# Patient Record
Sex: MALE | Race: WHITE | HISPANIC OR LATINO | Employment: FULL TIME | URBAN - METROPOLITAN AREA
[De-identification: names, ages, dates, MRNs, and addresses within clinical notes are randomized per-mention and may not be internally consistent; named-entity substitution may affect disease eponyms.]

---

## 2021-01-05 ENCOUNTER — PRE-ADMISSION TESTING (OUTPATIENT)
Dept: ADMISSIONS | Facility: MEDICAL CENTER | Age: 61
End: 2021-01-05
Attending: ORTHOPAEDIC SURGERY
Payer: COMMERCIAL

## 2021-01-05 DIAGNOSIS — Z01.810 PRE-OPERATIVE CARDIOVASCULAR EXAMINATION: ICD-10-CM

## 2021-01-05 DIAGNOSIS — Z01.812 PRE-OPERATIVE LABORATORY EXAMINATION: ICD-10-CM

## 2021-01-05 LAB
ANION GAP SERPL CALC-SCNC: 16 MMOL/L (ref 7–16)
BUN SERPL-MCNC: 20 MG/DL (ref 8–22)
CALCIUM SERPL-MCNC: 9.8 MG/DL (ref 8.5–10.5)
CHLORIDE SERPL-SCNC: 96 MMOL/L (ref 96–112)
CO2 SERPL-SCNC: 21 MMOL/L (ref 20–33)
CREAT SERPL-MCNC: 1.22 MG/DL (ref 0.5–1.4)
EKG IMPRESSION: NORMAL
EST. AVERAGE GLUCOSE BLD GHB EST-MCNC: 318 MG/DL
GLUCOSE SERPL-MCNC: 234 MG/DL (ref 65–99)
HBA1C MFR BLD: 12.7 % (ref 0–5.6)
POTASSIUM SERPL-SCNC: 4.5 MMOL/L (ref 3.6–5.5)
SODIUM SERPL-SCNC: 133 MMOL/L (ref 135–145)

## 2021-01-05 PROCEDURE — 80048 BASIC METABOLIC PNL TOTAL CA: CPT

## 2021-01-05 PROCEDURE — 93005 ELECTROCARDIOGRAM TRACING: CPT

## 2021-01-05 PROCEDURE — 93010 ELECTROCARDIOGRAM REPORT: CPT | Performed by: INTERNAL MEDICINE

## 2021-01-05 PROCEDURE — 83036 HEMOGLOBIN GLYCOSYLATED A1C: CPT

## 2021-01-05 PROCEDURE — 36415 COLL VENOUS BLD VENIPUNCTURE: CPT

## 2021-01-05 RX ORDER — MULTIVIT WITH MINERALS/LUTEIN
1 TABLET ORAL EVERY MORNING
COMMUNITY
End: 2021-02-18

## 2021-01-05 RX ORDER — SULFAMETHOXAZOLE AND TRIMETHOPRIM 800; 160 MG/1; MG/1
1 TABLET ORAL 2 TIMES DAILY
Status: ON HOLD | COMMUNITY
End: 2021-02-01

## 2021-01-05 RX ORDER — MULTIVIT-MIN/IRON/FOLIC ACID/K 18-600-40
4000 CAPSULE ORAL EVERY MORNING
COMMUNITY
End: 2021-02-18

## 2021-01-05 RX ORDER — GLIPIZIDE 5 MG/1
5 TABLET ORAL 2 TIMES DAILY PRN
COMMUNITY

## 2021-01-05 NOTE — OR NURSING
Pt tested (+) for covid 12/13, denies symptoms since 12/15 minus continued shortness of breath with exertion. Dr. Smalls's office notified. Per office wants to proceed with surgery.

## 2021-01-05 NOTE — OR NURSING
COVID-19 Pre-surgery screenin. Do you have an undiagnosed respiratory illness or symptoms such as coughing or sneezing? No   a. Onset of Sx No  b. Acute vs. chronic respiratory illness No    2. Do you have an unexplained fever greater than 100.4 degrees Fahrenheit or 38 degrees Celsius?     No    3. Have you had direct exposure to a patient who tested positive for Covid-19?    No    4. Have you had any loss of your sense of taste or smell? Have you had N/V or sore throat? No    Patient has been informed of visitor policy and asked to wear a mask upon entering the hospital   Yes

## 2021-01-06 ENCOUNTER — ANESTHESIA (OUTPATIENT)
Dept: SURGERY | Facility: MEDICAL CENTER | Age: 61
End: 2021-01-06
Payer: COMMERCIAL

## 2021-01-06 ENCOUNTER — HOSPITAL ENCOUNTER (OUTPATIENT)
Facility: MEDICAL CENTER | Age: 61
End: 2021-01-06
Attending: ORTHOPAEDIC SURGERY | Admitting: ORTHOPAEDIC SURGERY
Payer: COMMERCIAL

## 2021-01-06 ENCOUNTER — APPOINTMENT (OUTPATIENT)
Dept: RADIOLOGY | Facility: MEDICAL CENTER | Age: 61
End: 2021-01-06
Attending: ORTHOPAEDIC SURGERY
Payer: COMMERCIAL

## 2021-01-06 ENCOUNTER — ANESTHESIA EVENT (OUTPATIENT)
Dept: SURGERY | Facility: MEDICAL CENTER | Age: 61
End: 2021-01-06
Payer: COMMERCIAL

## 2021-01-06 VITALS
BODY MASS INDEX: 24.54 KG/M2 | RESPIRATION RATE: 14 BRPM | SYSTOLIC BLOOD PRESSURE: 100 MMHG | HEIGHT: 73 IN | HEART RATE: 69 BPM | WEIGHT: 185.19 LBS | DIASTOLIC BLOOD PRESSURE: 58 MMHG | TEMPERATURE: 96.6 F | OXYGEN SATURATION: 93 %

## 2021-01-06 DIAGNOSIS — E11.621 DIABETIC ULCER OF RIGHT MIDFOOT ASSOCIATED WITH TYPE 2 DIABETES MELLITUS, WITH NECROSIS OF BONE (HCC): ICD-10-CM

## 2021-01-06 DIAGNOSIS — L97.414 DIABETIC ULCER OF RIGHT MIDFOOT ASSOCIATED WITH TYPE 2 DIABETES MELLITUS, WITH NECROSIS OF BONE (HCC): ICD-10-CM

## 2021-01-06 LAB
GLUCOSE BLD-MCNC: 102 MG/DL (ref 65–99)
GLUCOSE BLD-MCNC: 122 MG/DL (ref 65–99)
GLUCOSE BLD-MCNC: 129 MG/DL (ref 65–99)
GLUCOSE BLD-MCNC: 255 MG/DL (ref 65–99)
GLUCOSE BLD-MCNC: 75 MG/DL (ref 65–99)
PATHOLOGY CONSULT NOTE: NORMAL

## 2021-01-06 PROCEDURE — 160009 HCHG ANES TIME/MIN: Performed by: ORTHOPAEDIC SURGERY

## 2021-01-06 PROCEDURE — 700101 HCHG RX REV CODE 250

## 2021-01-06 PROCEDURE — 87077 CULTURE AEROBIC IDENTIFY: CPT | Mod: 91

## 2021-01-06 PROCEDURE — 87070 CULTURE OTHR SPECIMN AEROBIC: CPT

## 2021-01-06 PROCEDURE — 160035 HCHG PACU - 1ST 60 MINS PHASE I: Performed by: ORTHOPAEDIC SURGERY

## 2021-01-06 PROCEDURE — C1713 ANCHOR/SCREW BN/BN,TIS/BN: HCPCS | Performed by: ORTHOPAEDIC SURGERY

## 2021-01-06 PROCEDURE — A6454 SELF-ADHER BAND W>=3" <5"/YD: HCPCS | Performed by: ORTHOPAEDIC SURGERY

## 2021-01-06 PROCEDURE — 160025 RECOVERY II MINUTES (STATS): Performed by: ORTHOPAEDIC SURGERY

## 2021-01-06 PROCEDURE — 88305 TISSUE EXAM BY PATHOLOGIST: CPT

## 2021-01-06 PROCEDURE — 82962 GLUCOSE BLOOD TEST: CPT | Mod: 91

## 2021-01-06 PROCEDURE — A9270 NON-COVERED ITEM OR SERVICE: HCPCS | Performed by: ORTHOPAEDIC SURGERY

## 2021-01-06 PROCEDURE — 160041 HCHG SURGERY MINUTES - EA ADDL 1 MIN LEVEL 4: Performed by: ORTHOPAEDIC SURGERY

## 2021-01-06 PROCEDURE — 87015 SPECIMEN INFECT AGNT CONCNTJ: CPT | Mod: 91

## 2021-01-06 PROCEDURE — 700101 HCHG RX REV CODE 250: Performed by: ANESTHESIOLOGY

## 2021-01-06 PROCEDURE — A6223 GAUZE >16<=48 NO W/SAL W/O B: HCPCS | Performed by: ORTHOPAEDIC SURGERY

## 2021-01-06 PROCEDURE — 87205 SMEAR GRAM STAIN: CPT

## 2021-01-06 PROCEDURE — 160047 HCHG PACU  - EA ADDL 30 MINS PHASE II: Performed by: ORTHOPAEDIC SURGERY

## 2021-01-06 PROCEDURE — 160029 HCHG SURGERY MINUTES - 1ST 30 MINS LEVEL 4: Performed by: ORTHOPAEDIC SURGERY

## 2021-01-06 PROCEDURE — 160002 HCHG RECOVERY MINUTES (STAT): Performed by: ORTHOPAEDIC SURGERY

## 2021-01-06 PROCEDURE — 501838 HCHG SUTURE GENERAL: Performed by: ORTHOPAEDIC SURGERY

## 2021-01-06 PROCEDURE — 160036 HCHG PACU - EA ADDL 30 MINS PHASE I: Performed by: ORTHOPAEDIC SURGERY

## 2021-01-06 PROCEDURE — 160046 HCHG PACU - 1ST 60 MINS PHASE II: Performed by: ORTHOPAEDIC SURGERY

## 2021-01-06 PROCEDURE — 64447 NJX AA&/STRD FEMORAL NRV IMG: CPT | Performed by: ORTHOPAEDIC SURGERY

## 2021-01-06 PROCEDURE — 500881 HCHG PACK, EXTREMITY: Performed by: ORTHOPAEDIC SURGERY

## 2021-01-06 PROCEDURE — 700101 HCHG RX REV CODE 250: Performed by: ORTHOPAEDIC SURGERY

## 2021-01-06 PROCEDURE — 501330 HCHG SET, CYSTO IRRIG TUBING: Performed by: ORTHOPAEDIC SURGERY

## 2021-01-06 PROCEDURE — 64445 NJX AA&/STRD SCIATIC NRV IMG: CPT | Performed by: ORTHOPAEDIC SURGERY

## 2021-01-06 PROCEDURE — 160048 HCHG OR STATISTICAL LEVEL 1-5: Performed by: ORTHOPAEDIC SURGERY

## 2021-01-06 PROCEDURE — 700111 HCHG RX REV CODE 636 W/ 250 OVERRIDE (IP): Performed by: ANESTHESIOLOGY

## 2021-01-06 PROCEDURE — 700102 HCHG RX REV CODE 250 W/ 637 OVERRIDE(OP): Performed by: ANESTHESIOLOGY

## 2021-01-06 PROCEDURE — 88311 DECALCIFY TISSUE: CPT | Mod: 91

## 2021-01-06 PROCEDURE — A9270 NON-COVERED ITEM OR SERVICE: HCPCS | Performed by: ANESTHESIOLOGY

## 2021-01-06 PROCEDURE — 700111 HCHG RX REV CODE 636 W/ 250 OVERRIDE (IP): Performed by: ORTHOPAEDIC SURGERY

## 2021-01-06 PROCEDURE — 73620 X-RAY EXAM OF FOOT: CPT | Mod: RT

## 2021-01-06 PROCEDURE — 87186 SC STD MICRODIL/AGAR DIL: CPT | Mod: 91

## 2021-01-06 PROCEDURE — 700102 HCHG RX REV CODE 250 W/ 637 OVERRIDE(OP)

## 2021-01-06 PROCEDURE — 700105 HCHG RX REV CODE 258: Performed by: ORTHOPAEDIC SURGERY

## 2021-01-06 PROCEDURE — 87076 CULTURE ANAEROBE IDENT EACH: CPT

## 2021-01-06 PROCEDURE — 87075 CULTR BACTERIA EXCEPT BLOOD: CPT | Mod: 91

## 2021-01-06 DEVICE — WIRE K- SMOOTH .062 - (3TX6=18): Type: IMPLANTABLE DEVICE | Status: FUNCTIONAL

## 2021-01-06 RX ORDER — VANCOMYCIN HYDROCHLORIDE 1 G/20ML
INJECTION, POWDER, LYOPHILIZED, FOR SOLUTION INTRAVENOUS
Status: COMPLETED | OUTPATIENT
Start: 2021-01-06 | End: 2021-01-06

## 2021-01-06 RX ORDER — ONDANSETRON 2 MG/ML
INJECTION INTRAMUSCULAR; INTRAVENOUS PRN
Status: DISCONTINUED | OUTPATIENT
Start: 2021-01-06 | End: 2021-01-06 | Stop reason: SURG

## 2021-01-06 RX ORDER — HYDROMORPHONE HYDROCHLORIDE 1 MG/ML
0.4 INJECTION, SOLUTION INTRAMUSCULAR; INTRAVENOUS; SUBCUTANEOUS
Status: DISCONTINUED | OUTPATIENT
Start: 2021-01-06 | End: 2021-01-06 | Stop reason: HOSPADM

## 2021-01-06 RX ORDER — HYDROMORPHONE HYDROCHLORIDE 1 MG/ML
0.2 INJECTION, SOLUTION INTRAMUSCULAR; INTRAVENOUS; SUBCUTANEOUS
Status: DISCONTINUED | OUTPATIENT
Start: 2021-01-06 | End: 2021-01-06 | Stop reason: HOSPADM

## 2021-01-06 RX ORDER — MIDAZOLAM HYDROCHLORIDE 1 MG/ML
INJECTION INTRAMUSCULAR; INTRAVENOUS PRN
Status: DISCONTINUED | OUTPATIENT
Start: 2021-01-06 | End: 2021-01-06 | Stop reason: SURG

## 2021-01-06 RX ORDER — CEFAZOLIN SODIUM 1 G/3ML
INJECTION, POWDER, FOR SOLUTION INTRAMUSCULAR; INTRAVENOUS PRN
Status: DISCONTINUED | OUTPATIENT
Start: 2021-01-06 | End: 2021-01-06 | Stop reason: SURG

## 2021-01-06 RX ORDER — ONDANSETRON 2 MG/ML
4 INJECTION INTRAMUSCULAR; INTRAVENOUS
Status: DISCONTINUED | OUTPATIENT
Start: 2021-01-06 | End: 2021-01-06 | Stop reason: HOSPADM

## 2021-01-06 RX ORDER — BUPIVACAINE HYDROCHLORIDE 5 MG/ML
INJECTION, SOLUTION EPIDURAL; INTRACAUDAL
Status: COMPLETED | OUTPATIENT
Start: 2021-01-06 | End: 2021-01-06

## 2021-01-06 RX ORDER — ACETAMINOPHEN 500 MG
1000 TABLET ORAL ONCE
Status: COMPLETED | OUTPATIENT
Start: 2021-01-06 | End: 2021-01-06

## 2021-01-06 RX ORDER — HALOPERIDOL 5 MG/ML
1 INJECTION INTRAMUSCULAR
Status: DISCONTINUED | OUTPATIENT
Start: 2021-01-06 | End: 2021-01-06 | Stop reason: HOSPADM

## 2021-01-06 RX ORDER — DEXTROSE MONOHYDRATE 25 G/50ML
INJECTION, SOLUTION INTRAVENOUS
Status: COMPLETED
Start: 2021-01-06 | End: 2021-01-06

## 2021-01-06 RX ORDER — ROPIVACAINE HYDROCHLORIDE 5 MG/ML
INJECTION, SOLUTION EPIDURAL; INFILTRATION; PERINEURAL
Status: COMPLETED | OUTPATIENT
Start: 2021-01-06 | End: 2021-01-06

## 2021-01-06 RX ORDER — LABETALOL HYDROCHLORIDE 5 MG/ML
5 INJECTION, SOLUTION INTRAVENOUS
Status: DISCONTINUED | OUTPATIENT
Start: 2021-01-06 | End: 2021-01-06 | Stop reason: HOSPADM

## 2021-01-06 RX ORDER — SODIUM CHLORIDE, SODIUM LACTATE, POTASSIUM CHLORIDE, CALCIUM CHLORIDE 600; 310; 30; 20 MG/100ML; MG/100ML; MG/100ML; MG/100ML
INJECTION, SOLUTION INTRAVENOUS CONTINUOUS
Status: DISCONTINUED | OUTPATIENT
Start: 2021-01-06 | End: 2021-01-06 | Stop reason: HOSPADM

## 2021-01-06 RX ORDER — HYDROMORPHONE HYDROCHLORIDE 1 MG/ML
0.1 INJECTION, SOLUTION INTRAMUSCULAR; INTRAVENOUS; SUBCUTANEOUS
Status: DISCONTINUED | OUTPATIENT
Start: 2021-01-06 | End: 2021-01-06 | Stop reason: HOSPADM

## 2021-01-06 RX ORDER — OXYCODONE HCL 5 MG/5 ML
10 SOLUTION, ORAL ORAL
Status: DISCONTINUED | OUTPATIENT
Start: 2021-01-06 | End: 2021-01-06 | Stop reason: HOSPADM

## 2021-01-06 RX ORDER — CELECOXIB 200 MG/1
200 CAPSULE ORAL ONCE
Status: COMPLETED | OUTPATIENT
Start: 2021-01-06 | End: 2021-01-06

## 2021-01-06 RX ORDER — DIPHENHYDRAMINE HYDROCHLORIDE 50 MG/ML
12.5 INJECTION INTRAMUSCULAR; INTRAVENOUS
Status: DISCONTINUED | OUTPATIENT
Start: 2021-01-06 | End: 2021-01-06 | Stop reason: HOSPADM

## 2021-01-06 RX ORDER — DEXTROSE MONOHYDRATE 25 G/50ML
25 INJECTION, SOLUTION INTRAVENOUS ONCE
Status: COMPLETED | OUTPATIENT
Start: 2021-01-06 | End: 2021-01-06

## 2021-01-06 RX ORDER — OXYCODONE HCL 5 MG/5 ML
5 SOLUTION, ORAL ORAL
Status: DISCONTINUED | OUTPATIENT
Start: 2021-01-06 | End: 2021-01-06 | Stop reason: HOSPADM

## 2021-01-06 RX ORDER — MEPERIDINE HYDROCHLORIDE 25 MG/ML
12.5 INJECTION INTRAMUSCULAR; INTRAVENOUS; SUBCUTANEOUS
Status: DISCONTINUED | OUTPATIENT
Start: 2021-01-06 | End: 2021-01-06 | Stop reason: HOSPADM

## 2021-01-06 RX ORDER — GABAPENTIN 300 MG/1
300 CAPSULE ORAL ONCE
Status: COMPLETED | OUTPATIENT
Start: 2021-01-06 | End: 2021-01-06

## 2021-01-06 RX ORDER — LIDOCAINE HYDROCHLORIDE 20 MG/ML
INJECTION, SOLUTION EPIDURAL; INFILTRATION; INTRACAUDAL; PERINEURAL PRN
Status: DISCONTINUED | OUTPATIENT
Start: 2021-01-06 | End: 2021-01-06 | Stop reason: SURG

## 2021-01-06 RX ORDER — KETOROLAC TROMETHAMINE 30 MG/ML
INJECTION, SOLUTION INTRAMUSCULAR; INTRAVENOUS PRN
Status: DISCONTINUED | OUTPATIENT
Start: 2021-01-06 | End: 2021-01-06 | Stop reason: SURG

## 2021-01-06 RX ORDER — DEXAMETHASONE SODIUM PHOSPHATE 4 MG/ML
INJECTION, SOLUTION INTRA-ARTICULAR; INTRALESIONAL; INTRAMUSCULAR; INTRAVENOUS; SOFT TISSUE PRN
Status: DISCONTINUED | OUTPATIENT
Start: 2021-01-06 | End: 2021-01-06 | Stop reason: SURG

## 2021-01-06 RX ADMIN — CELECOXIB 200 MG: 200 CAPSULE ORAL at 11:47

## 2021-01-06 RX ADMIN — ACETAMINOPHEN 1000 MG: 500 TABLET ORAL at 11:47

## 2021-01-06 RX ADMIN — LIDOCAINE HYDROCHLORIDE 100 MG: 20 INJECTION, SOLUTION EPIDURAL; INFILTRATION; INTRACAUDAL at 13:03

## 2021-01-06 RX ADMIN — CEFAZOLIN 2 G: 330 INJECTION, POWDER, FOR SOLUTION INTRAMUSCULAR; INTRAVENOUS at 13:02

## 2021-01-06 RX ADMIN — BUPIVACAINE HYDROCHLORIDE 25 ML: 5 INJECTION, SOLUTION EPIDURAL; INTRACAUDAL; PERINEURAL at 12:42

## 2021-01-06 RX ADMIN — DEXTROSE MONOHYDRATE 25 ML: 25 INJECTION, SOLUTION INTRAVENOUS at 15:20

## 2021-01-06 RX ADMIN — ROPIVACAINE HYDROCHLORIDE 5 ML: 5 INJECTION, SOLUTION EPIDURAL; INFILTRATION; PERINEURAL at 12:42

## 2021-01-06 RX ADMIN — DEXTROSE MONOHYDRATE 25 ML: 25 INJECTION, SOLUTION INTRAVENOUS at 14:37

## 2021-01-06 RX ADMIN — ONDANSETRON 4 MG: 2 INJECTION INTRAMUSCULAR; INTRAVENOUS at 13:03

## 2021-01-06 RX ADMIN — BUPIVACAINE HYDROCHLORIDE 20 ML: 5 INJECTION, SOLUTION EPIDURAL; INTRACAUDAL; PERINEURAL at 13:05

## 2021-01-06 RX ADMIN — MIDAZOLAM HYDROCHLORIDE 2 MG: 1 INJECTION, SOLUTION INTRAMUSCULAR; INTRAVENOUS at 13:00

## 2021-01-06 RX ADMIN — PROPOFOL 150 MG: 10 INJECTION, EMULSION INTRAVENOUS at 13:03

## 2021-01-06 RX ADMIN — POVIDONE IODINE 15 ML: 100 SOLUTION TOPICAL at 11:59

## 2021-01-06 RX ADMIN — GABAPENTIN 300 MG: 300 CAPSULE ORAL at 11:47

## 2021-01-06 RX ADMIN — DEXAMETHASONE SODIUM PHOSPHATE 4 MG: 4 INJECTION, SOLUTION INTRA-ARTICULAR; INTRALESIONAL; INTRAMUSCULAR; INTRAVENOUS; SOFT TISSUE at 13:03

## 2021-01-06 RX ADMIN — KETOROLAC TROMETHAMINE 15 MG: 30 INJECTION, SOLUTION INTRAMUSCULAR at 13:03

## 2021-01-06 RX ADMIN — FENTANYL CITRATE 100 MCG: 50 INJECTION, SOLUTION INTRAMUSCULAR; INTRAVENOUS at 13:00

## 2021-01-06 RX ADMIN — SODIUM CHLORIDE, POTASSIUM CHLORIDE, SODIUM LACTATE AND CALCIUM CHLORIDE: 600; 310; 30; 20 INJECTION, SOLUTION INTRAVENOUS at 11:46

## 2021-01-06 ASSESSMENT — PAIN SCALES - GENERAL: PAIN_LEVEL: 2

## 2021-01-06 NOTE — ANESTHESIA PROCEDURE NOTES
Peripheral Block    Date/Time: 1/6/2021 1:05 PM  Performed by: Nader Crow M.D.  Authorized by: Nadre Crow M.D.     Start Time:  1/6/2021 1:05 PM  End Time:  1/6/2021 1:07 PM  Reason for Block: at surgeon's request and post-op pain management ONLY    patient identified, IV checked, site marked, risks and benefits discussed, surgical consent, monitors and equipment checked, pre-op evaluation and timeout performed    Patient Position:  Supine  Prep: ChloraPrep    Monitoring:  Heart rate, continuous pulse ox and cardiac monitor  Block Region:  Lower Extremity  Lower Extremity - Block Type:  Selective FEMORAL nerve block at the Adductor Canal    Laterality:  Right  Procedures: ultrasound guided  Image captured, interpreted and electronically stored.  Local Infiltration:  Lidocaine  Strength:  1 %  Dose:  3 ml  Block Type:  Single-shot  Needle Length:  100mm  Needle Gauge:  21 G  Needle Localization:  Ultrasound guidance  Injection Assessment:  Negative aspiration for heme, no paresthesia on injection, incremental injection and local visualized surrounding nerve on ultrasound

## 2021-01-06 NOTE — PROGRESS NOTES
Med Rec completed per patient and patient family at bedside  Allergies reviewed    Patient reports took Bactrim DS from 12/31/2020-01/04/2021

## 2021-01-06 NOTE — OP REPORT
DATE OF SERVICE:  01/06/2021     PREOPERATIVE DIAGNOSES:  1.  Diabetes.  2.  Neuropathy.  3.  Right plantar foot ulcer with deep abscess.  4.  Right lesser toe deformities.     POSTOPERATIVE DIAGNOSES:  1.  Diabetes.  2.  Neuropathy.  3.  Right plantar foot ulcer with deep abscess.  4.  Right lesser toe deformities.     PROCEDURES PERFORMED:  1.  Right gastrocsoleus recession.  2.  Right metatarsal head excision 2 through 5.  3.  Right hammertoe, 2 and 3.  4.  Right irrigation and debridement of foot, multiple compartments.  5.  Right bone biopsy, second metatarsal.     SURGEON:  Jerald Smalls MD     ASSISTANT:  Lola Sauceda CFA     ANESTHESIA:  General endotracheal with popliteal block for postoperative pain   management.     ESTIMATED BLOOD LOSS:  None.     COMPLICATIONS:  None.     POSTOPERATIVE PLAN:   1.  Nonweightbearing.  2.  Antibiotics.  3.  Follow up in 1 weeks.     SPECIMENS:  1.  Right second metatarsal head.  2.  Right second metatarsal margin both for culture and sensitivity.     INDICATIONS:  The patient is a pleasant 60-year-old male who has had problems   with his right foot.  The above diagnoses made and options were discussed with   him including operative and nonoperative procedure, he elected to undergo   operative intervention.  The above procedure were discussed and all questions   were answered.  The risks of surgery were explained which included but not   limited to wound, postop infection, nerve injury, vascular injury, and need   for surgery.  He understands he could have persistent risk of his pain,   deformity, infection, and need for further surgery.  He understands and accept   these risks and agreed to proceed.  Site was marked by myself prior to   psychotropic medicines.     DESCRIPTION OF PROCEDURE IN DETAIL:  The patient was brought into the   operating room.  He underwent general endotracheal anesthetic without   complications.  His right lower extremity was prepped and  draped in standard   fashion in supine position with all appropriate padding.   Positive site   verification confirmed his right lower extremity as well as above procedure   and confirmation that he had received preoperative antibiotics.  An Esmarch   was used.  A tourniquet was inflated to 250 mmHg.  Posteromedial incision was   made at the level of the musculotendinous junction of the gastroc.  It was   brought down to the crural fascia exposing the gastroc tendon.  Under direct   visualization from the lateral to medial, the gastroc tendon was released,   preserving the underlying soleus fascia.  The wound was irrigated with copious   irrigation and was closed in layered fashion using 3-0 Vicryl and nylon.     Incision was made over the dorsal aspect of his foot and the second and fourth   web space dissected down.  The extensor tendon of the second toe was   completely excised.  This was brought down.  Sharp excisional debridement was   brought down to the level of bone, debriding out any tissue that appeared to   be potentially nonviable down to the level of the second metatarsal and third   metatarsal.  Microsagittal saw was used to remove the metatarsal of the second   just proximal to the neck.  This was removed.  The exact procedure was   performed to the third, fourth, and fifth toes.     On the plantar aspect of his foot, sharp excisional debridement was around his   ulceration.  The ulceration measured approximately 3 x 4 cm going down to the   level of bone.  There was necrotic tissue, which was removed.  Once all   tissue appeared to be viable, a thorough irrigation was performed.  All tissue   appeared to be viable at that point.     Elliptical incision was made over the dorsal aspect of the second proximal   interphalangeal joint.  Microsagittal saw was used to remove the distal aspect   of the proximal phalanx.  A 0.062 K-wire was then run retrograde out to the   tip of the toe and back into across  the proximal phalanx and then into the   second metatarsal.  Exact procedure was performed on the third toe without   deviations and complications.  On the fourth and fifth toe, no hammertoe   correction was performed and so the pin was placed through the tip of the toe   across the length of the toe through the proximal phalanx and then into the   metatarsal.     Microsagittal saw was used to remove a segment of the second metatarsal and   this was labeled as margin and sent for culture and pathology.  Final   irrigation was performed.  Vancomycin powder was placed into the wound.  The   plantar aspect was loosely closed with 2-0 nylon and the skin dorsally was   closed with 3-0 nylon.  Sterile dressing was applied.  Tourniquet released.    All toes are pink.  He was transferred to recovery in good condition.     Utilization of Lola Sauceda was necessary for patient positioning,   retraction, closure, and dressing placement.  She was present for the entire   procedure.        ______________________________  MD MURALI LLOYD/PAULA    DD:  01/06/2021 14:25  DT:  01/06/2021 15:58    Job#:  [id number]

## 2021-01-06 NOTE — ANESTHESIA PROCEDURE NOTES
Airway    Date/Time: 1/6/2021 1:08 PM  Performed by: Nader Crow M.D.  Authorized by: Nader Crow M.D.     Location:  OR  Urgency:  Elective  Indications for Airway Management:  Anesthesia      Spontaneous Ventilation: absent    Sedation Level:  Deep  Preoxygenated: Yes    Final Airway Type:  Supraglottic airway  Final Supraglottic Airway:  Standard LMA    SGA Size:  4  Number of Attempts at Approach:  1

## 2021-01-06 NOTE — ANESTHESIA PROCEDURE NOTES
Peripheral Block    Date/Time: 1/6/2021 12:42 PM  Performed by: Nader Crow M.D.  Authorized by: Nader Crow M.D.     Start Time:  1/6/2021 12:42 PM  End Time:  1/6/2021 12:45 PM  Reason for Block: at surgeon's request and post-op pain management ONLY    patient identified, IV checked, site marked, risks and benefits discussed, surgical consent, monitors and equipment checked, pre-op evaluation and timeout performed    Patient Position:  Supine  Prep: ChloraPrep    Monitoring:  Heart rate, continuous pulse ox and cardiac monitor  Block Region:  Lower Extremity  Lower Extremity - Block Type:  Sciatic, pos/sub - SCIATIC nerve block, posterior or sub-gluteal approach    Laterality:  Right  Procedures: ultrasound guided  Image captured, interpreted and electronically stored.  Local Infiltration:  Lidocaine  Strength:  1 %  Dose:  3 ml  Block Type:  Single-shot  Needle Length:  100mm  Needle Gauge:  21 G  Needle Localization:  Ultrasound guidance  Injection Assessment:  Negative aspiration for heme, no paresthesia on injection, incremental injection and local visualized surrounding nerve on ultrasound

## 2021-01-06 NOTE — OR NURSING
POC reviewed with pt. Call light within reach, educated to call for assistance, hourly rounding in place, bed in lowest position and locked. Multi Modals given.    Pt FSBS 255, Dr Crow aware, no new orders.

## 2021-01-06 NOTE — ANESTHESIA POSTPROCEDURE EVALUATION
Patient: Chaz Cornejo    Procedure Summary     Date: 01/06/21 Room / Location: [place] [address] / SURGERY [facility]    Anesthesia Start: 1259 Anesthesia Stop: 1429    Procedures:       IRRIGATION AND DEBRIDEMENT, WOUND - FOOT W/GASTROC SOLEUS RECESSION (Right Foot)      EXCISION, METATARSAL BONE, HEAD - 2-5 (Right Foot)      BIOPSY, BONE (Right Foot) Diagnosis: (NON-PRESSURE CHRONIC ULCER RIGHT FOOT, HAMMERTOE, ABSCESS OF BURSA RIGHT FOOT)    Surgeons: Jerald Smalls M.D. Responsible Provider: Nader Crow M.D.    Anesthesia Type: general, peripheral nerve block ASA Status: 2          Final Anesthesia Type: general, peripheral nerve block  Last vitals  BP   Blood Pressure: (!) 166/86    Temp   36.6 °C (97.8 °F)    Pulse   Pulse: 67   Resp   12    SpO2   100 %      Anesthesia Post Evaluation    Patient location during evaluation: PACU  Patient participation: complete - patient participated  Level of consciousness: awake and alert  Pain score: 2    Airway patency: patent  Anesthetic complications: no  Cardiovascular status: hemodynamically stable  Respiratory status: acceptable  Hydration status: euvolemic    PONV: none

## 2021-01-06 NOTE — ANESTHESIA TIME REPORT
Anesthesia Start and Stop Event Times     Date Time Event    1/6/2021 1241 Ready for Procedure     1259 Anesthesia Start     1429 Anesthesia Stop        Responsible Staff  01/06/21    Name Role Begin End    Nader Crow M.D. Anesth 1250 1141        Preop Diagnosis (Free Text):  Pre-op Diagnosis     NON-PRESSURE CHRONIC ULCER RIGHT FOOT, HAMMERTOE, ABSCESS OF BURSA RIGHT FOOT        Preop Diagnosis (Codes):    Post op Diagnosis  Diabetic foot infection (HCC)      Premium Reason  Non-Premium    Comments: block x2 usd

## 2021-01-07 LAB
GRAM STN SPEC: NORMAL
GRAM STN SPEC: NORMAL
SIGNIFICANT IND 70042: NORMAL
SIGNIFICANT IND 70042: NORMAL
SITE SITE: NORMAL
SITE SITE: NORMAL
SOURCE SOURCE: NORMAL
SOURCE SOURCE: NORMAL

## 2021-01-07 NOTE — OR NURSING
Discharge orders received. IV taken out. All belongings returned to pt. Pt changed into clothing with assistance.Discharge instructions given and discussed as well as pain management handout. Pt verbalizes understandings and all questions answered at this time. Pt states they are ready to be D/C home. Prescriptions given to pt and verbalizes understanding of medications. Pt D/C via wheelchair with all belongings with CNA

## 2021-01-07 NOTE — OR NURSING
The pt is awake and oriented. Respirations are regular and easy. Pain is controlled, the pt is comfortable. Dressing dry and intact.    Family updated by phone.    Dr Crow notified of blood sugar 122.

## 2021-01-07 NOTE — OR NURSING
Received pt from Recovery. No complaints of pain or N/V at this time. VSS. Dressing to R foot and in boot slight drainage around toes

## 2021-01-07 NOTE — DISCHARGE INSTRUCTIONS
ACTIVITY: Rest and take it easy for the first 24 hours.  A responsible adult is recommended to remain with you during that time.  It is normal to feel sleepy.  We encourage you to not do anything that requires balance, judgment or coordination.    MILD FLU-LIKE SYMPTOMS ARE NORMAL. YOU MAY EXPERIENCE GENERALIZED MUSCLE ACHES, THROAT IRRITATION, HEADACHE AND/OR SOME NAUSEA.    FOR 24 HOURS DO NOT:  Drive, operate machinery or run household appliances.  Drink beer or alcoholic beverages.   Make important decisions or sign legal documents.    SPECIAL INSTRUCTIONS: Touch down weight bearing  Stay in boot  Elevate/ice  Follow any other instructions in [facility] folder or by Physician     DIET: To avoid nausea, slowly advance diet as tolerated, avoiding spicy or greasy foods for the first day.  Add more substantial food to your diet according to your physician's instructions.  Babies can be fed formula or breast milk as soon as they are hungry.  INCREASE FLUIDS AND FIBER TO AVOID CONSTIPATION.    SURGICAL DRESSING/BATHING: Keep boot on and dressing dry. Do not shower for 48hrs. No baths, hot tubs, swimming pools. Cover with plastic when showering.    FOLLOW-UP APPOINTMENT:  A follow-up appointment should be arranged with your doctor in 1-2 weeks; call to schedule.    You should CALL YOUR PHYSICIAN if you develop:  Fever greater than 101 degrees F.  Pain not relieved by medication, or persistent nausea or vomiting.  Excessive bleeding (blood soaking through dressing) or unexpected drainage from the wound.  Extreme redness or swelling around the incision site, drainage of pus or foul smelling drainage.  Inability to urinate or empty your bladder within 8 hours.  Problems with breathing or chest pain.    You should call 911 if you develop problems with breathing or chest pain.  If you are unable to contact your doctor or surgical center, you should go to the nearest emergency room or urgent care center.  Physician's telephone #:  [phone] Dr. Smalls    If any questions arise, call your doctor.  If your doctor is not available, please feel free to call the Surgical Center at [phone]. The Contact Center is open Monday through Friday 7AM to 5PM and may speak to a nurse at [phone], or toll free at [phone].     A registered nurse may call you a few days after your surgery to see how you are doing after your procedure.    MEDICATIONS: Resume taking daily medication.  Take prescribed pain medication with food.  If no medication is prescribed, you may take non-aspirin pain medication if needed.  PAIN MEDICATION CAN BE VERY CONSTIPATING.  Take a stool softener or laxative such as senokot, pericolace, or milk of magnesia if needed.    Prescriptions with patient, follow any instructions from physician or in [facility] folder .  Last pain medication given at 11:47am .    If your physician has prescribed pain medication that includes Acetaminophen (Tylenol), do not take additional Acetaminophen (Tylenol) while taking the prescribed medication.        Peripheral Nerve Block Discharge Instructions from Same Day Surgery and Inpatient :    What to Expect - Lower Extremity  · The block may cause you to experience numbness and weakness in your calf and foot on the same side as your surgery  · Numbness, tingling and / or weakness are all normal. For some people, this may be an unpleasant sensation  · These issues will be resolved when the local anesthetic wears off   · You may experience numbness and tingling in your thigh on the same side as your surgery if the block medicine was injected at your groin area  · Numbness will make it difficult to walk  · You may have problems with balance and walking so be very careful   · Follow your surgeon's direction regarding weight bearing on your surgical limb  · Be very careful with your numb limb  Precautions  · The numbness may affect your balance  · Be careful when walking or moving around  · Your leg  "may be weak: be very careful putting weight on it  · If your surgeon did not specify a time, you should not bear weight for 24 hours  · Be sure to ask for help when you need it  · It is better to have help than to fall and hurt yourself  Prevent Injury  · Protect the limb like a baby  · Beware of exposing your limb to extreme heat or cold or trauma  · The limb may be injured without you noticing because it is numb  · Keep the limb elevated whenever possible  · Do not sleep on the limb  · Change the position of the limb regularly  · Avoid putting pressure on your surgical limb  Pain Control  · The initial block on the day of surgery will make your extremity feel \"numb\"  · Any consecutive injection including prior to discharge from the hospital will make your extremity feel \"numb\"  · You may feel an aching or burning when the local anesthesia starts to wear off  · Take pain pills as prescribed by your surgeon  · Call your surgeon or anesthesiologist if you do not have adequate pain control    Depression / Suicide Risk    As you are discharged from this [facility] facility, it is important to learn how to keep safe from harming yourself.    Recognize the warning signs:  · Abrupt changes in personality, positive or negative- including increase in energy   · Giving away possessions  · Change in eating patterns- significant weight changes-  positive or negative  · Change in sleeping patterns- unable to sleep or sleeping all the time   · Unwillingness or inability to communicate  · Depression  · Unusual sadness, discouragement and loneliness  · Talk of wanting to die  · Neglect of personal appearance   · Rebelliousness- reckless behavior  · Withdrawal from people/activities they love  · Confusion- inability to concentrate     If you or a loved one observes any of these behaviors or has concerns about self-harm, here's what you can do:  · Talk about it- your feelings and reasons for harming yourself  · Remove any means " that you might use to hurt yourself (examples: pills, rope, extension cords, firearm)  · Get professional help from the community (Mental Health, Substance Abuse, psychological counseling)  · Do not be alone:Call your Safe Contact- someone whom you trust who will be there for you.  · Call your local CRISIS HOTLINE 426-2190 or 522-774-5252  · Call your local Children's Mobile Crisis Response Team [place] (173) 658-8249 or www.[employer]  · Call the toll free National Suicide Prevention Hotlines   · National Suicide Prevention Lifeline 021-485-CDJK (7350)  · National Hope Line Network 800-SUICIDE (698-3037)

## 2021-01-11 LAB
BACTERIA TISS AEROBE CULT: ABNORMAL
GRAM STN SPEC: ABNORMAL
GRAM STN SPEC: ABNORMAL
SIGNIFICANT IND 70042: ABNORMAL
SIGNIFICANT IND 70042: ABNORMAL
SITE SITE: ABNORMAL
SITE SITE: ABNORMAL
SOURCE SOURCE: ABNORMAL
SOURCE SOURCE: ABNORMAL

## 2021-01-12 LAB
BACTERIA SPEC ANAEROBE CULT: ABNORMAL
SIGNIFICANT IND 70042: ABNORMAL
SIGNIFICANT IND 70042: ABNORMAL
SITE SITE: ABNORMAL
SITE SITE: ABNORMAL
SOURCE SOURCE: ABNORMAL
SOURCE SOURCE: ABNORMAL

## 2021-01-29 ENCOUNTER — PRE-ADMISSION TESTING (OUTPATIENT)
Dept: ADMISSIONS | Facility: MEDICAL CENTER | Age: 61
End: 2021-01-29
Attending: ORTHOPAEDIC SURGERY
Payer: COMMERCIAL

## 2021-01-29 DIAGNOSIS — Z01.812 PRE-OPERATIVE LABORATORY EXAMINATION: ICD-10-CM

## 2021-01-29 LAB
ANION GAP SERPL CALC-SCNC: 13 MMOL/L (ref 7–16)
BUN SERPL-MCNC: 16 MG/DL (ref 8–22)
CALCIUM SERPL-MCNC: 9.8 MG/DL (ref 8.5–10.5)
CHLORIDE SERPL-SCNC: 101 MMOL/L (ref 96–112)
CO2 SERPL-SCNC: 21 MMOL/L (ref 20–33)
CREAT SERPL-MCNC: 1.06 MG/DL (ref 0.5–1.4)
GLUCOSE SERPL-MCNC: 215 MG/DL (ref 65–99)
POTASSIUM SERPL-SCNC: 4.5 MMOL/L (ref 3.6–5.5)
SODIUM SERPL-SCNC: 135 MMOL/L (ref 135–145)

## 2021-01-29 PROCEDURE — 36415 COLL VENOUS BLD VENIPUNCTURE: CPT

## 2021-01-29 PROCEDURE — 80048 BASIC METABOLIC PNL TOTAL CA: CPT

## 2021-02-01 ENCOUNTER — ANESTHESIA (OUTPATIENT)
Dept: SURGERY | Facility: MEDICAL CENTER | Age: 61
End: 2021-02-01
Payer: COMMERCIAL

## 2021-02-01 ENCOUNTER — ANESTHESIA EVENT (OUTPATIENT)
Dept: SURGERY | Facility: MEDICAL CENTER | Age: 61
End: 2021-02-01
Payer: COMMERCIAL

## 2021-02-01 ENCOUNTER — HOSPITAL ENCOUNTER (OUTPATIENT)
Facility: MEDICAL CENTER | Age: 61
End: 2021-02-01
Attending: ORTHOPAEDIC SURGERY | Admitting: ORTHOPAEDIC SURGERY
Payer: COMMERCIAL

## 2021-02-01 VITALS
SYSTOLIC BLOOD PRESSURE: 122 MMHG | RESPIRATION RATE: 14 BRPM | BODY MASS INDEX: 27.09 KG/M2 | WEIGHT: 204.37 LBS | DIASTOLIC BLOOD PRESSURE: 70 MMHG | OXYGEN SATURATION: 97 % | TEMPERATURE: 96.7 F | HEIGHT: 73 IN | HEART RATE: 96 BPM

## 2021-02-01 LAB — GLUCOSE BLD-MCNC: 150 MG/DL (ref 65–99)

## 2021-02-01 PROCEDURE — 700101 HCHG RX REV CODE 250: Performed by: ANESTHESIOLOGY

## 2021-02-01 PROCEDURE — 501838 HCHG SUTURE GENERAL: Performed by: ORTHOPAEDIC SURGERY

## 2021-02-01 PROCEDURE — 160048 HCHG OR STATISTICAL LEVEL 1-5: Performed by: ORTHOPAEDIC SURGERY

## 2021-02-01 PROCEDURE — 87070 CULTURE OTHR SPECIMN AEROBIC: CPT

## 2021-02-01 PROCEDURE — 87077 CULTURE AEROBIC IDENTIFY: CPT

## 2021-02-01 PROCEDURE — 700111 HCHG RX REV CODE 636 W/ 250 OVERRIDE (IP): Performed by: ORTHOPAEDIC SURGERY

## 2021-02-01 PROCEDURE — 160035 HCHG PACU - 1ST 60 MINS PHASE I: Performed by: ORTHOPAEDIC SURGERY

## 2021-02-01 PROCEDURE — 160038 HCHG SURGERY MINUTES - EA ADDL 1 MIN LEVEL 2: Performed by: ORTHOPAEDIC SURGERY

## 2021-02-01 PROCEDURE — 700101 HCHG RX REV CODE 250: Performed by: ORTHOPAEDIC SURGERY

## 2021-02-01 PROCEDURE — 160046 HCHG PACU - 1ST 60 MINS PHASE II: Performed by: ORTHOPAEDIC SURGERY

## 2021-02-01 PROCEDURE — 500881 HCHG PACK, EXTREMITY: Performed by: ORTHOPAEDIC SURGERY

## 2021-02-01 PROCEDURE — 87075 CULTR BACTERIA EXCEPT BLOOD: CPT

## 2021-02-01 PROCEDURE — 501330 HCHG SET, CYSTO IRRIG TUBING: Performed by: ORTHOPAEDIC SURGERY

## 2021-02-01 PROCEDURE — 700111 HCHG RX REV CODE 636 W/ 250 OVERRIDE (IP): Performed by: ANESTHESIOLOGY

## 2021-02-01 PROCEDURE — 160036 HCHG PACU - EA ADDL 30 MINS PHASE I: Performed by: ORTHOPAEDIC SURGERY

## 2021-02-01 PROCEDURE — 82962 GLUCOSE BLOOD TEST: CPT

## 2021-02-01 PROCEDURE — 87015 SPECIMEN INFECT AGNT CONCNTJ: CPT

## 2021-02-01 PROCEDURE — 160009 HCHG ANES TIME/MIN: Performed by: ORTHOPAEDIC SURGERY

## 2021-02-01 PROCEDURE — A6223 GAUZE >16<=48 NO W/SAL W/O B: HCPCS | Performed by: ORTHOPAEDIC SURGERY

## 2021-02-01 PROCEDURE — 700105 HCHG RX REV CODE 258: Performed by: ORTHOPAEDIC SURGERY

## 2021-02-01 PROCEDURE — A9270 NON-COVERED ITEM OR SERVICE: HCPCS | Performed by: ORTHOPAEDIC SURGERY

## 2021-02-01 PROCEDURE — 87186 SC STD MICRODIL/AGAR DIL: CPT

## 2021-02-01 PROCEDURE — 160002 HCHG RECOVERY MINUTES (STAT): Performed by: ORTHOPAEDIC SURGERY

## 2021-02-01 PROCEDURE — 160027 HCHG SURGERY MINUTES - 1ST 30 MINS LEVEL 2: Performed by: ORTHOPAEDIC SURGERY

## 2021-02-01 PROCEDURE — 87205 SMEAR GRAM STAIN: CPT

## 2021-02-01 PROCEDURE — 160025 RECOVERY II MINUTES (STATS): Performed by: ORTHOPAEDIC SURGERY

## 2021-02-01 PROCEDURE — A6454 SELF-ADHER BAND W>=3" <5"/YD: HCPCS | Performed by: ORTHOPAEDIC SURGERY

## 2021-02-01 DEVICE — MATRISTEM WOUND MATRIX 5CM X 5CM 2 LAYER: Type: IMPLANTABLE DEVICE | Site: FOOT | Status: FUNCTIONAL

## 2021-02-01 DEVICE — MATRISTEM MICROMATRIX 500MG (1/EA): Type: IMPLANTABLE DEVICE | Site: FOOT | Status: FUNCTIONAL

## 2021-02-01 RX ORDER — METOCLOPRAMIDE HYDROCHLORIDE 5 MG/ML
INJECTION INTRAMUSCULAR; INTRAVENOUS PRN
Status: DISCONTINUED | OUTPATIENT
Start: 2021-02-01 | End: 2021-02-01 | Stop reason: SURG

## 2021-02-01 RX ORDER — DIPHENHYDRAMINE HYDROCHLORIDE 50 MG/ML
12.5 INJECTION INTRAMUSCULAR; INTRAVENOUS
Status: DISCONTINUED | OUTPATIENT
Start: 2021-02-01 | End: 2021-02-01 | Stop reason: HOSPADM

## 2021-02-01 RX ORDER — ONDANSETRON 2 MG/ML
INJECTION INTRAMUSCULAR; INTRAVENOUS PRN
Status: DISCONTINUED | OUTPATIENT
Start: 2021-02-01 | End: 2021-02-01 | Stop reason: SURG

## 2021-02-01 RX ORDER — SODIUM CHLORIDE, SODIUM LACTATE, POTASSIUM CHLORIDE, CALCIUM CHLORIDE 600; 310; 30; 20 MG/100ML; MG/100ML; MG/100ML; MG/100ML
INJECTION, SOLUTION INTRAVENOUS CONTINUOUS
Status: DISCONTINUED | OUTPATIENT
Start: 2021-02-01 | End: 2021-02-01 | Stop reason: HOSPADM

## 2021-02-01 RX ORDER — ONDANSETRON 2 MG/ML
4 INJECTION INTRAMUSCULAR; INTRAVENOUS
Status: DISCONTINUED | OUTPATIENT
Start: 2021-02-01 | End: 2021-02-01 | Stop reason: HOSPADM

## 2021-02-01 RX ORDER — MAGNESIUM HYDROXIDE 1200 MG/15ML
LIQUID ORAL
Status: COMPLETED | OUTPATIENT
Start: 2021-02-01 | End: 2021-02-01

## 2021-02-01 RX ORDER — MEPERIDINE HYDROCHLORIDE 25 MG/ML
12.5 INJECTION INTRAMUSCULAR; INTRAVENOUS; SUBCUTANEOUS
Status: DISCONTINUED | OUTPATIENT
Start: 2021-02-01 | End: 2021-02-01 | Stop reason: HOSPADM

## 2021-02-01 RX ORDER — HYDROMORPHONE HYDROCHLORIDE 1 MG/ML
0.2 INJECTION, SOLUTION INTRAMUSCULAR; INTRAVENOUS; SUBCUTANEOUS
Status: DISCONTINUED | OUTPATIENT
Start: 2021-02-01 | End: 2021-02-01 | Stop reason: HOSPADM

## 2021-02-01 RX ORDER — HALOPERIDOL 5 MG/ML
1 INJECTION INTRAMUSCULAR
Status: DISCONTINUED | OUTPATIENT
Start: 2021-02-01 | End: 2021-02-01 | Stop reason: HOSPADM

## 2021-02-01 RX ORDER — LIDOCAINE HYDROCHLORIDE 10 MG/ML
INJECTION, SOLUTION INFILTRATION; PERINEURAL
Status: DISCONTINUED | OUTPATIENT
Start: 2021-02-01 | End: 2021-02-01 | Stop reason: HOSPADM

## 2021-02-01 RX ORDER — MIDAZOLAM HYDROCHLORIDE 1 MG/ML
INJECTION INTRAMUSCULAR; INTRAVENOUS PRN
Status: DISCONTINUED | OUTPATIENT
Start: 2021-02-01 | End: 2021-02-01 | Stop reason: SURG

## 2021-02-01 RX ORDER — HYDROMORPHONE HYDROCHLORIDE 1 MG/ML
0.5 INJECTION, SOLUTION INTRAMUSCULAR; INTRAVENOUS; SUBCUTANEOUS
Status: DISCONTINUED | OUTPATIENT
Start: 2021-02-01 | End: 2021-02-01 | Stop reason: HOSPADM

## 2021-02-01 RX ORDER — KETOROLAC TROMETHAMINE 30 MG/ML
INJECTION, SOLUTION INTRAMUSCULAR; INTRAVENOUS PRN
Status: DISCONTINUED | OUTPATIENT
Start: 2021-02-01 | End: 2021-02-01 | Stop reason: SURG

## 2021-02-01 RX ORDER — OXYCODONE HCL 5 MG/5 ML
5 SOLUTION, ORAL ORAL
Status: DISCONTINUED | OUTPATIENT
Start: 2021-02-01 | End: 2021-02-01 | Stop reason: HOSPADM

## 2021-02-01 RX ORDER — VANCOMYCIN HYDROCHLORIDE 500 MG/10ML
INJECTION, POWDER, LYOPHILIZED, FOR SOLUTION INTRAVENOUS
Status: COMPLETED | OUTPATIENT
Start: 2021-02-01 | End: 2021-02-01

## 2021-02-01 RX ORDER — BUPIVACAINE HYDROCHLORIDE 5 MG/ML
INJECTION, SOLUTION EPIDURAL; INTRACAUDAL
Status: DISCONTINUED | OUTPATIENT
Start: 2021-02-01 | End: 2021-02-01 | Stop reason: HOSPADM

## 2021-02-01 RX ORDER — LIDOCAINE HYDROCHLORIDE 20 MG/ML
INJECTION, SOLUTION EPIDURAL; INFILTRATION; INTRACAUDAL; PERINEURAL PRN
Status: DISCONTINUED | OUTPATIENT
Start: 2021-02-01 | End: 2021-02-01 | Stop reason: SURG

## 2021-02-01 RX ORDER — SODIUM CHLORIDE, SODIUM LACTATE, POTASSIUM CHLORIDE, CALCIUM CHLORIDE 600; 310; 30; 20 MG/100ML; MG/100ML; MG/100ML; MG/100ML
INJECTION, SOLUTION INTRAVENOUS CONTINUOUS
Status: ACTIVE | OUTPATIENT
Start: 2021-02-01 | End: 2021-02-01

## 2021-02-01 RX ORDER — OXYCODONE HCL 5 MG/5 ML
10 SOLUTION, ORAL ORAL
Status: DISCONTINUED | OUTPATIENT
Start: 2021-02-01 | End: 2021-02-01 | Stop reason: HOSPADM

## 2021-02-01 RX ORDER — HYDRALAZINE HYDROCHLORIDE 20 MG/ML
5 INJECTION INTRAMUSCULAR; INTRAVENOUS
Status: DISCONTINUED | OUTPATIENT
Start: 2021-02-01 | End: 2021-02-01 | Stop reason: HOSPADM

## 2021-02-01 RX ORDER — CEFAZOLIN SODIUM 1 G/3ML
INJECTION, POWDER, FOR SOLUTION INTRAMUSCULAR; INTRAVENOUS PRN
Status: DISCONTINUED | OUTPATIENT
Start: 2021-02-01 | End: 2021-02-01 | Stop reason: SURG

## 2021-02-01 RX ORDER — HYDROMORPHONE HYDROCHLORIDE 1 MG/ML
0.4 INJECTION, SOLUTION INTRAMUSCULAR; INTRAVENOUS; SUBCUTANEOUS
Status: DISCONTINUED | OUTPATIENT
Start: 2021-02-01 | End: 2021-02-01 | Stop reason: HOSPADM

## 2021-02-01 RX ADMIN — FENTANYL CITRATE 100 MCG: 50 INJECTION, SOLUTION INTRAMUSCULAR; INTRAVENOUS at 13:08

## 2021-02-01 RX ADMIN — EPHEDRINE SULFATE 10 MG: 50 INJECTION, SOLUTION INTRAVENOUS at 13:16

## 2021-02-01 RX ADMIN — EPHEDRINE SULFATE 10 MG: 50 INJECTION, SOLUTION INTRAVENOUS at 13:27

## 2021-02-01 RX ADMIN — MIDAZOLAM HYDROCHLORIDE 2 MG: 1 INJECTION, SOLUTION INTRAMUSCULAR; INTRAVENOUS at 13:04

## 2021-02-01 RX ADMIN — ONDANSETRON 4 MG: 2 INJECTION INTRAMUSCULAR; INTRAVENOUS at 13:57

## 2021-02-01 RX ADMIN — METOCLOPRAMIDE 10 MG: 5 INJECTION, SOLUTION INTRAMUSCULAR; INTRAVENOUS at 13:12

## 2021-02-01 RX ADMIN — EPHEDRINE SULFATE 10 MG: 50 INJECTION, SOLUTION INTRAVENOUS at 13:33

## 2021-02-01 RX ADMIN — KETOROLAC TROMETHAMINE 30 MG: 30 INJECTION, SOLUTION INTRAMUSCULAR at 13:57

## 2021-02-01 RX ADMIN — SODIUM CHLORIDE, POTASSIUM CHLORIDE, SODIUM LACTATE AND CALCIUM CHLORIDE: 600; 310; 30; 20 INJECTION, SOLUTION INTRAVENOUS at 11:12

## 2021-02-01 RX ADMIN — EPHEDRINE SULFATE 10 MG: 50 INJECTION, SOLUTION INTRAVENOUS at 13:18

## 2021-02-01 RX ADMIN — POVIDONE IODINE 15 ML: 100 SOLUTION TOPICAL at 11:05

## 2021-02-01 RX ADMIN — PROPOFOL 150 MG: 10 INJECTION, EMULSION INTRAVENOUS at 13:08

## 2021-02-01 RX ADMIN — LIDOCAINE HYDROCHLORIDE 100 MG: 20 INJECTION, SOLUTION EPIDURAL; INFILTRATION; INTRACAUDAL at 13:08

## 2021-02-01 RX ADMIN — CEFAZOLIN 2 G: 330 INJECTION, POWDER, FOR SOLUTION INTRAMUSCULAR; INTRAVENOUS at 13:04

## 2021-02-01 ASSESSMENT — PAIN SCALES - GENERAL: PAIN_LEVEL: 0

## 2021-02-01 NOTE — OR NURSING
Arrived to Phase II after report from Arin DANG.    VSS, denies nausea, denies pain. Ambulated from gurney to chair with standby assist.    Surgical site to R foot.    Alarms on and set to appropriate parameters. Call light within reach, rounding in place.

## 2021-02-01 NOTE — ANESTHESIA TIME REPORT
Anesthesia Start and Stop Event Times     Date Time Event    2/1/2021 1251 Ready for Procedure     1304 Anesthesia Start     1409 Anesthesia Stop        Responsible Staff  02/01/21    Name Role Begin End    Curly Garcia M.D. 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

## 2021-02-01 NOTE — PROGRESS NOTES
Med rec completed per Pt and Pt's spouse at bedside.  Allergies reviewed. No known drug allergies.  Pt is on a 6 week course of IV ertapenem 1g/24hr infusions started 1/22/2021.

## 2021-02-01 NOTE — OR NURSING
Discharge order in place. Pt's VSS; denies N/V; states pain is at tolerable level. Dressing C/D/I to R foot. Discharge instructions given as well as pain management handout; pt and family verbalized understanding and questions answered. Pt changed into clothing with assistance. PICC line in place. Patient states ready to d/c home. Pt dc'd home in wheelchair by RN.

## 2021-02-01 NOTE — ANESTHESIA PROCEDURE NOTES
Airway    Date/Time: 2/1/2021 1:09 PM  Performed by: Curly Garcia M.D.  Authorized by: Curly Garcia M.D.     Location:  OR  Urgency:  Elective  Indications for Airway Management:  Anesthesia      Spontaneous Ventilation: absent    Sedation Level:  Deep  Preoxygenated: Yes    Final Airway Type:  Supraglottic airway  Final Supraglottic Airway:  Standard LMA    SGA Size:  4  Number of Attempts at Approach:  1   Atraumatic insertion, good seal

## 2021-02-01 NOTE — ANESTHESIA POSTPROCEDURE EVALUATION
Patient: Chaz Cornejo    Procedure Summary     Date: 02/01/21 Room / Location: [place] [address] / SURGERY [facility]    Anesthesia Start: 1304 Anesthesia Stop: 1409    Procedures:       AMPUTATION, TOE - 2-3, APPLICATION OF SKIN SUBSTITUTE (Right Toe)      IRRIGATION AND DEBRIDEMENT, WOUND - FOOT (Right Foot) Diagnosis: (GANGRENE RIGHT 2ND and 3RD TOES)    Surgeons: Jerald Smalls M.D. Responsible Provider: Curly Garcia M.D.    Anesthesia Type: general ASA Status: 2          Final Anesthesia Type: general  Last vitals  BP   Blood Pressure: 122/70    Temp   35.9 °C (96.7 °F)    Pulse   Pulse: 96   Resp   14    SpO2   97 %      Anesthesia Post Evaluation    Patient location during evaluation: PACU  Patient participation: complete - patient participated  Level of consciousness: awake and alert  Pain score: 0    Airway patency: patent  Anesthetic complications: no  Cardiovascular status: hemodynamically stable  Respiratory status: acceptable  Hydration status: euvolemic    PONV: none           Nurse Pain Score: 0 (NPRS)

## 2021-02-01 NOTE — DISCHARGE INSTRUCTIONS
ACTIVITY: Rest and take it easy for the first 24 hours.  A responsible adult is recommended to remain with you during that time.  It is normal to feel sleepy.  We encourage you to not do anything that requires balance, judgment or coordination.    MILD FLU-LIKE SYMPTOMS ARE NORMAL. YOU MAY EXPERIENCE GENERALIZED MUSCLE ACHES, THROAT IRRITATION, HEADACHE AND/OR SOME NAUSEA.    FOR 24 HOURS DO NOT:  Drive, operate machinery or run household appliances.  Drink beer or alcoholic beverages.   Make important decisions or sign legal documents.    SPECIAL INSTRUCTIONS: OK to weight bear while wearing boot.     DIET: To avoid nausea, slowly advance diet as tolerated, avoiding spicy or greasy foods for the first day.  Add more substantial food to your diet according to your physician's instructions.  Babies can be fed formula or breast milk as soon as they are hungry.  INCREASE FLUIDS AND FIBER TO AVOID CONSTIPATION.    SURGICAL DRESSING/BATHING: Follow instructions given by doctor.    FOLLOW-UP APPOINTMENT:  A follow-up appointment should be arranged with your doctor in 1 week; call to schedule.    You should CALL YOUR PHYSICIAN if you develop:  Fever greater than 101 degrees F.  Pain not relieved by medication, or persistent nausea or vomiting.  Excessive bleeding (blood soaking through dressing) or unexpected drainage from the wound.  Extreme redness or swelling around the incision site, drainage of pus or foul smelling drainage.  Inability to urinate or empty your bladder within 8 hours.  Problems with breathing or chest pain.    You should call 911 if you develop problems with breathing or chest pain.  If you are unable to contact your doctor or surgical center, you should go to the nearest emergency room or urgent care center.  Physician's telephone #: Dr. Smalls [phone]    If any questions arise, call your doctor.  If your doctor is not available, please feel free to call the Surgical Center at [phone].  The Contact Center is open Monday through Friday 7AM to 5PM and may speak to a nurse at [phone], or toll free at [phone].     A registered nurse may call you a few days after your surgery to see how you are doing after your procedure.    MEDICATIONS: Resume taking daily medication.  Take prescribed pain medication with food.  If no medication is prescribed, you may take non-aspirin pain medication if needed.  PAIN MEDICATION CAN BE VERY CONSTIPATING.  Take a stool softener or laxative such as senokot, pericolace, or milk of magnesia if needed.    No prescriptions given. No pain medication given in recovery.    If your physician has prescribed pain medication that includes Acetaminophen (Tylenol), do not take additional Acetaminophen (Tylenol) while taking the prescribed medication.    Depression / Suicide Risk    As you are discharged from this [facility] facility, it is important to learn how to keep safe from harming yourself.    Recognize the warning signs:  · Abrupt changes in personality, positive or negative- including increase in energy   · Giving away possessions  · Change in eating patterns- significant weight changes-  positive or negative  · Change in sleeping patterns- unable to sleep or sleeping all the time   · Unwillingness or inability to communicate  · Depression  · Unusual sadness, discouragement and loneliness  · Talk of wanting to die  · Neglect of personal appearance   · Rebelliousness- reckless behavior  · Withdrawal from people/activities they love  · Confusion- inability to concentrate     If you or a loved one observes any of these behaviors or has concerns about self-harm, here's what you can do:  · Talk about it- your feelings and reasons for harming yourself  · Remove any means that you might use to hurt yourself (examples: pills, rope, extension cords, firearm)  · Get professional help from the community (Mental Health, Substance Abuse, psychological counseling)  · Do not  be alone:Call your Safe Contact- someone whom you trust who will be there for you.  · Call your local CRISIS HOTLINE 714-8674 or 035-115-8815  · Call your local Children's Mobile Crisis Response Team [place] (480) 943-4137 or www.Heyy  · Call the toll free National Suicide Prevention Hotlines   · National Suicide Prevention Lifeline 366-763-FBZG (4241)  · National [facility] Line Network 800-SUICIDE (206-5408)

## 2021-02-01 NOTE — ANESTHESIA PREPROCEDURE EVALUATION
DM type 2 (oral meds), hx of COVID in mid-December (symptoms resolved for over 4 weeks).   Denies: MI/CHF/smoking/CVA/CKD      Relevant Problems   No relevant active problems       Physical Exam    Airway   Mallampati: II  TM distance: >3 FB  Neck ROM: full       Cardiovascular - normal exam  Rhythm: regular  Rate: normal  (-) murmur     Dental - normal exam           Pulmonary - normal exam  Breath sounds clear to auscultation     Abdominal    Neurological - normal exam                 Anesthesia Plan    ASA 2       Plan - general       Airway plan will be LMA        Induction: intravenous    Postoperative Plan: Postoperative administration of opioids is intended.    Pertinent diagnostic labs and testing reviewed    Informed Consent:    Anesthetic plan and risks discussed with patient.    Use of blood products discussed with: patient whom consented to blood products.

## 2021-02-02 LAB
GRAM STN SPEC: NORMAL
SIGNIFICANT IND 70042: NORMAL
SITE SITE: NORMAL
SOURCE SOURCE: NORMAL

## 2021-02-02 NOTE — OP REPORT
DATE OF SERVICE:  02/01/2021     PREOPERATIVE DIAGNOSES:  1.  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  2.  Right foot infection.     POSTOPERATIVE DIAGNOSES:  1.  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  2.  Right foot infection.     PROCEDURES:  1.  Right amputation toes 2 and 3 of metatarsophalangeal joint.  2.  Irrigation and debridement of right foot, multiple compartments deep.  3.  Placement of skin graft substitute over the dorsal aspect of the right   foot.     SURGEON:  Jerald Smalls MD     FIRST ASSISTANT:  Allyssa Tipton MD     SECOND ASSISTANT:  Lola Sauceda CFA     ANESTHESIA:  General endotracheal.     ESTIMATED BLOOD LOSS:  None.     COMPLICATIONS:  None.     SPECIMENS:  Deep foot cultures.     POSTOPERATIVE PLAN:  Follow up in a week.     INDICATIONS:  The patient is a pleasant 60-year-old male.  He presented to the   office with gangrene toes after previous irrigation and debridement.  We   followed this until they had demarcated.  Options were discussed with him   including operative and nonoperative.  He elected to undergo intervention.    The procedure was discussed.  All questions were answered.  Risks of surgery   were explained, which included, but not limited to wound problems, infection,   nerve injury, vascular injury, and need for surgery.  He understands he could   have persistent risk of pain, infection and need for further surgery.  He   understands and accepts these risks and agreed to proceed.  His site was   marked by myself prior to receiving psychotropic medicines.     DESCRIPTION OF PROCEDURE:  He was brought to the operating room and placed   under general endotracheal anesthetic without complications.  His right lower   extremity was prepped and draped in standard fashion in the supine position   with all appropriate padding.  Positive site verification confirmed his right   lower extremity as well as above procedure and confirmation that he received   preoperative  antibiotics.  Leg was elevated and tourniquet was inflated to 250   mmHg.  Using a 15 blade, a skin cut was made just proximal to the areas of   gangrene on the second and third toe.  Through that deep full thickness,   dissection was made down to the metatarsophalangeal joint where a   disarticulation was performed, amputating the toes.  He does have a lot of   necrotic nonviable tissue deep and 15 blade was used to make a sharp   excisional debridement down to the level of bone, debriding out the plantar   and dorsal compartments.  In addition, all of his sutures on his foot were   removed and there were areas of full thickness skin loss over each of the   incisions.  These were cleaned off to get margins with excisional debridement.    Thorough irrigation was performed.  All tissue appeared to be viable.  A   mixture of vancomycin powder and MicroMatrix was used to fill the wound and at   the base of the dorsal incisions.  ACL skin graft substitute less than 20   square cm was sutured into the dorsal segments.  Over the site where the toe   amputation was, 15 blade was used to further debride and excise nonviable skin   to get a nice tension-free closure over his amputation site.  Tourniquet was   released.  Sterile dressings were applied.  He was transferred to the recovery   room in good condition.     Utilization of Dr. Tipton was necessary for the patient positioning,   retraction, closure and dressing placement.  He was present for the entire   procedure.        ______________________________  MD MURALI LLOYD/ISIAH/BRAD    DD:  02/01/2021 14:23  DT:  02/01/2021 16:01    Job#:  [id number]

## 2021-02-08 LAB
BACTERIA SPEC ANAEROBE CULT: NORMAL
BACTERIA TISS AEROBE CULT: ABNORMAL
GRAM STN SPEC: ABNORMAL
SIGNIFICANT IND 70042: ABNORMAL
SIGNIFICANT IND 70042: NORMAL
SITE SITE: ABNORMAL
SITE SITE: NORMAL
SOURCE SOURCE: ABNORMAL
SOURCE SOURCE: NORMAL

## 2021-02-10 ENCOUNTER — HOSPITAL ENCOUNTER (OUTPATIENT)
Facility: MEDICAL CENTER | Age: 61
End: 2021-02-10
Attending: INTERNAL MEDICINE
Payer: COMMERCIAL

## 2021-02-10 PROCEDURE — 87077 CULTURE AEROBIC IDENTIFY: CPT

## 2021-02-10 PROCEDURE — 87205 SMEAR GRAM STAIN: CPT

## 2021-02-10 PROCEDURE — 87070 CULTURE OTHR SPECIMN AEROBIC: CPT

## 2021-02-10 PROCEDURE — 87075 CULTR BACTERIA EXCEPT BLOOD: CPT

## 2021-02-12 LAB
GRAM STN SPEC: NORMAL
SIGNIFICANT IND 70042: NORMAL
SITE SITE: NORMAL
SOURCE SOURCE: NORMAL

## 2021-02-13 LAB
BACTERIA WND AEROBE CULT: ABNORMAL
BACTERIA WND AEROBE CULT: ABNORMAL
GRAM STN SPEC: ABNORMAL
SIGNIFICANT IND 70042: ABNORMAL
SITE SITE: ABNORMAL
SOURCE SOURCE: ABNORMAL

## 2021-02-15 LAB
BACTERIA SPEC ANAEROBE CULT: NORMAL
SIGNIFICANT IND 70042: NORMAL
SITE SITE: NORMAL
SOURCE SOURCE: NORMAL

## 2021-02-18 ENCOUNTER — OFFICE VISIT (OUTPATIENT)
Dept: WOUND CARE | Facility: MEDICAL CENTER | Age: 61
End: 2021-02-18
Attending: NURSE PRACTITIONER
Payer: COMMERCIAL

## 2021-02-18 VITALS
RESPIRATION RATE: 16 BRPM | HEART RATE: 94 BPM | TEMPERATURE: 98.1 F | DIASTOLIC BLOOD PRESSURE: 75 MMHG | OXYGEN SATURATION: 98 % | SYSTOLIC BLOOD PRESSURE: 116 MMHG

## 2021-02-18 DIAGNOSIS — S88.912A AMPUTATION OF LEFT LOWER EXTREMITY, INITIAL ENCOUNTER (HCC): Primary | ICD-10-CM

## 2021-02-18 DIAGNOSIS — E11.21 CONTROLLED TYPE 2 DIABETES MELLITUS WITH DIABETIC NEPHROPATHY, UNSPECIFIED WHETHER LONG TERM INSULIN USE (HCC): ICD-10-CM

## 2021-02-18 PROCEDURE — 99213 OFFICE O/P EST LOW 20 MIN: CPT

## 2021-02-18 PROCEDURE — 11042 DBRDMT SUBQ TIS 1ST 20SQCM/<: CPT | Performed by: NURSE PRACTITIONER

## 2021-02-18 PROCEDURE — 99213 OFFICE O/P EST LOW 20 MIN: CPT | Mod: 25 | Performed by: NURSE PRACTITIONER

## 2021-02-18 PROCEDURE — 11042 DBRDMT SUBQ TIS 1ST 20SQCM/<: CPT

## 2021-02-18 RX ORDER — DOXYCYCLINE HYCLATE 100 MG/1
100 CAPSULE ORAL 2 TIMES DAILY
COMMUNITY
Start: 2021-02-10 | End: 2021-03-15

## 2021-02-18 ASSESSMENT — ENCOUNTER SYMPTOMS
DIZZINESS: 0
DIARRHEA: 0
PALPITATIONS: 0
CHILLS: 0
WEAKNESS: 0
NAUSEA: 0
DOUBLE VISION: 0
BLURRED VISION: 0
COUGH: 0
WHEEZING: 0
CONSTIPATION: 0
FEVER: 0
VOMITING: 0
HEADACHES: 0
SHORTNESS OF BREATH: 0

## 2021-02-18 NOTE — PROGRESS NOTES
Wound supply order faxed to [facility] at fax #[phone].     Wound 02/18/21 Diabetic Ulcer Foot Dorsal Right --Right Dorsal 3rd MTH (Active)   Wound Image    02/18/21 0815   Site Assessment Pink;Yellow 02/18/21 0815   Periwound Assessment Intact 02/18/21 0815   Margins Unattached edges 02/18/21 0815   Drainage Amount Moderate 02/18/21 0815   Drainage Description Serosanguineous 02/18/21 0815   Treatments Cleansed;Topical Lidocaine;Provider debridement 02/18/21 0815   Wound Cleansing Normal Saline Irrigation 02/18/21 0815   Dressing Cleansing/Solutions Normal Saline 02/18/21 0815   Dressing Options Collagen Dressing;Hydrofiber Silver Strip;Nonadhesive Foam;Dry Roll Gauze;Coban 02/18/21 0815   Dressing Changed New 02/18/21 0815   Dressing Change/Treatment Frequency Every 72 hrs, and As Needed 02/18/21 0815   Non-staged Wound Description Full thickness 02/18/21 0815   Wound Length (cm) 1.7 cm 02/18/21 0815   Wound Width (cm) 1.1 cm 02/18/21 0815   Wound Depth (cm) 0.3 cm 02/18/21 0815   Wound Surface Area (cm^2) 1.87 cm^2 02/18/21 0815   Wound Volume (cm^3) 0.56 cm^3 02/18/21 0815   Post-Procedure Length (cm) 1.7 cm 02/18/21 0815   Post-Procedure Width (cm) 1.3 cm 02/18/21 0815   Post-Procedure Depth (cm) 1.9 cm 02/18/21 0815   Post-Procedure Surface Area (cm^2) 2.21 cm^2 02/18/21 0815   Post-Procedure Volume (cm^3) 4.2 cm^3 02/18/21 0815   Wound Bed Granulation (%) 5 % 02/18/21 0815   Wound Bed Epithelium (%) 0 % 02/18/21 0815   Wound Bed Slough (%) 95 % 02/18/21 0815   Wound Bed Eschar (%) 0 % 02/18/21 0815   Tunneling (cm) 0 cm 02/18/21 0815   Undermining (cm) 0 cm 02/18/21 0815   Wound Odor None 02/18/21 0815   Pulses DP;2+ 02/18/21 0815   Right Foot Monofilament 10-point exam (Sensate) 5/10 02/18/21 0815   Exposed Structures JAY 02/18/21 0815       Wound 02/18/21 Diabetic Ulcer Foot Dorsal Right --Right Dorsal 5th MTH (Active)   Wound Image    02/18/21 0815   Site Assessment Pink;Yellow 02/18/21 0815   Periwound  Assessment Scar tissue;Intact 02/18/21 0815   Margins Unattached edges 02/18/21 0815   Drainage Amount Moderate 02/18/21 0815   Drainage Description Serosanguineous 02/18/21 0815   Treatments Cleansed;Topical Lidocaine;Provider debridement 02/18/21 0815   Wound Cleansing Normal Saline Irrigation 02/18/21 0815   Dressing Cleansing/Solutions Normal Saline 02/18/21 0815   Dressing Options Collagen Dressing;Hydrofiber Silver;Nonadhesive Foam;Dry Roll Gauze;Coban 02/18/21 0815   Dressing Changed New 02/18/21 0815   Dressing Change/Treatment Frequency Every 72 hrs, and As Needed 02/18/21 0815   Non-staged Wound Description Full thickness 02/18/21 0815   Wound Length (cm) 2.5 cm 02/18/21 0815   Wound Width (cm) 1 cm 02/18/21 0815   Wound Depth (cm) 0.3 cm 02/18/21 0815   Wound Surface Area (cm^2) 2.5 cm^2 02/18/21 0815   Wound Volume (cm^3) 0.75 cm^3 02/18/21 0815   Post-Procedure Length (cm) 2.6 cm 02/18/21 0815   Post-Procedure Width (cm) 1 cm 02/18/21 0815   Post-Procedure Depth (cm) 0.6 cm 02/18/21 0815   Post-Procedure Surface Area (cm^2) 2.6 cm^2 02/18/21 0815   Post-Procedure Volume (cm^3) 1.56 cm^3 02/18/21 0815   Wound Bed Granulation (%) 5 % 02/18/21 0815   Wound Bed Epithelium (%) 0 % 02/18/21 0815   Wound Bed Slough (%) 95 % 02/18/21 0815   Wound Bed Eschar (%) 0 % 02/18/21 0815   Tunneling (cm) 0 cm 02/18/21 0815   Undermining (cm) 0 cm 02/18/21 0815   Wound Odor None 02/18/21 0815   Pulses DP;2+ 02/18/21 0815   Right Foot Monofilament 10-point exam (Sensate) 5/10 02/18/21 0815   Exposed Structures JAY 02/18/21 0815

## 2021-02-18 NOTE — PATIENT INSTRUCTIONS
-Keep dressings clean and dry. Change dressings once between wound clinic visits, and if the dressings become saturated, soiled, or fall off.    -Avoid prolonged standing or sitting without elevating your legs.    -Remove your compression garments if you have severe pain, severe swelling, numbness, color change, or temperature change in your toes. If you need to remove your compression garments, do so by unrolling them. Do not cut the compression garments off, this is to prevent cutting yourself on accident.    -Never walk around the house barefoot. Wear your offloading boot any time you are up walking.    -Should you experience any significant changes in your wound(s), such as infection (redness, swelling, localized heat, increased pain, fever > 101 F, chills) or have any questions regarding your home care instructions, please contact the wound center at [phone]. If after hours, contact your primary care physician or go to the hospital emergency room.

## 2021-02-19 NOTE — PROGRESS NOTES
Provider Encounter- Full Thickness wound    HISTORY OF PRESENT ILLNESS  Wound History:    START OF CARE IN CLINIC: 2/18/2021    REFERRING PROVIDER: ROME Shook     WOUND- Full Thickness Wound   LOCATION: Right dorsal third MTH, right dorsal fifth MTH   HISTORY: Patient was originally taken to surgery by Dr. Jerald Smalls on 1/6/2021 patient underwent a right gastrocsoleus resection, right metatarsal head excision 2 through 5, right hammertoe second and third, right irrigation debridement of foot multiple compartments, right bone biopsy second metatarsal.  At that time the culture grew out  Peptoniphilus asaccharolyticus.   Patient developed right foot infection with gangrene of the second and third toes. Patient was subsequently taken back to surgery by Dr. Smalls on 2/1/2021.  Dr. Smalls at that time performed a right amputation toes 2 and 3 of metatarsal phalangeal joint, irrigation debridement right foot multiple compartments deep, placement of skin graft substitute over dorsal aspect of right foot.  Of note patient is on doxycycline with an end date of 3/15/2021.  Patient presents to [facility] for care of third and fifth MTH dorsal wounds.    Pertinent Medical History: Diabetes, hypertension, COVID-19    TOBACCO USE: Former smoker quit in 2018 0.3 packs/day for 30 years.    Patient's problem list, allergies, and current medications reviewed and updated in Epic    Interval History:  2/18/2021: Clinic visit with ROME Oliver. Patient states that they are feeling well today.  Patient denies fever, chills, nausea, vomiting, lightheadedness, dizziness, shortness of breath and chest pain.  Patient with significant/nonviable tissue and small hematoma to second MTH.  Patient has some granulation tissue to fifth MTH.  Talked with Neisha Faulkner from [facility] regarding a snap VAC we will put in for approval.  Lisa [facility] has put in for authorization.      REVIEW OF SYSTEMS:   Review of Systems    Constitutional: Negative for chills and fever.   HENT: Negative for hearing loss.    Eyes: Negative for blurred vision and double vision.   Respiratory: Negative for cough, shortness of breath and wheezing.    Cardiovascular: Positive for leg swelling. Negative for chest pain and palpitations.   Gastrointestinal: Negative for constipation, diarrhea, nausea and vomiting.   Musculoskeletal: Negative for joint pain.   Skin: Negative for itching and rash.        Right foot open wound   Neurological: Negative for dizziness, weakness and headaches.       PHYSICAL EXAMINATION:   /75   Pulse 94   Temp 36.7 °C (98.1 °F) (Temporal)   Resp 16   SpO2 98%     Physical Exam   Constitutional: He is well-developed, well-nourished, and in no distress.   HENT:   Head: Normocephalic and atraumatic.   Eyes: Pupils are equal, round, and reactive to light. Conjunctivae are normal.   Cardiovascular:   Monophasic to biphasic pulses are 2 DP and PT by Doppler   Pulmonary/Chest: Effort normal. No respiratory distress. He has no wheezes.   Musculoskeletal:         General: Deformity present.      Cervical back: Normal range of motion.       WOUND ASSESSMENT       Wound 02/18/21 Diabetic Ulcer Foot Dorsal Right --Right Dorsal 3rd MTH (Active)   Wound Image    02/18/21 0815   Site Assessment Pink;Yellow 02/18/21 0815   Periwound Assessment Intact 02/18/21 0815   Margins Unattached edges 02/18/21 0815   Drainage Amount Moderate 02/18/21 0815   Drainage Description Serosanguineous 02/18/21 0815   Treatments Cleansed;Topical Lidocaine;Provider debridement 02/18/21 0815   Wound Cleansing Normal Saline Irrigation 02/18/21 0815   Dressing Cleansing/Solutions Normal Saline 02/18/21 0815   Dressing Options Collagen Dressing;Hydrofiber Silver Strip;Nonadhesive Foam;Dry Roll Gauze;Coban 02/18/21 0815   Dressing Changed New 02/18/21 0815   Dressing Change/Treatment Frequency Every 72 hrs, and As Needed 02/18/21 0815   Non-staged Wound  Description Full thickness 02/18/21 0815   Wound Length (cm) 1.7 cm 02/18/21 0815   Wound Width (cm) 1.1 cm 02/18/21 0815   Wound Depth (cm) 0.3 cm 02/18/21 0815   Wound Surface Area (cm^2) 1.87 cm^2 02/18/21 0815   Wound Volume (cm^3) 0.56 cm^3 02/18/21 0815   Post-Procedure Length (cm) 1.7 cm 02/18/21 0815   Post-Procedure Width (cm) 1.3 cm 02/18/21 0815   Post-Procedure Depth (cm) 1.9 cm 02/18/21 0815   Post-Procedure Surface Area (cm^2) 2.21 cm^2 02/18/21 0815   Post-Procedure Volume (cm^3) 4.2 cm^3 02/18/21 0815   Wound Bed Granulation (%) 5 % 02/18/21 0815   Wound Bed Epithelium (%) 0 % 02/18/21 0815   Wound Bed Slough (%) 95 % 02/18/21 0815   Wound Bed Eschar (%) 0 % 02/18/21 0815   Tunneling (cm) 0 cm 02/18/21 0815   Undermining (cm) 0 cm 02/18/21 0815   Wound Odor None 02/18/21 0815   Pulses DP;2+ 02/18/21 0815   Right Foot Monofilament 10-point exam (Sensate) 5/10 02/18/21 0815   Exposed Structures JAY 02/18/21 0815       Wound 02/18/21 Diabetic Ulcer Foot Dorsal Right --Right Dorsal 5th MTH (Active)   Wound Image    02/18/21 0815   Site Assessment Pink;Yellow 02/18/21 0815   Periwound Assessment Scar tissue;Intact 02/18/21 0815   Margins Unattached edges 02/18/21 0815   Drainage Amount Moderate 02/18/21 0815   Drainage Description Serosanguineous 02/18/21 0815   Treatments Cleansed;Topical Lidocaine;Provider debridement 02/18/21 0815   Wound Cleansing Normal Saline Irrigation 02/18/21 0815   Dressing Cleansing/Solutions Normal Saline 02/18/21 0815   Dressing Options Collagen Dressing;Hydrofiber Silver;Nonadhesive Foam;Dry Roll Gauze;Coban 02/18/21 0815   Dressing Changed New 02/18/21 0815   Dressing Change/Treatment Frequency Every 72 hrs, and As Needed 02/18/21 0815   Non-staged Wound Description Full thickness 02/18/21 0815   Wound Length (cm) 2.5 cm 02/18/21 0815   Wound Width (cm) 1 cm 02/18/21 0815   Wound Depth (cm) 0.3 cm 02/18/21 0815   Wound Surface Area (cm^2) 2.5 cm^2 02/18/21 0815   Wound  Volume (cm^3) 0.75 cm^3 21   Post-Procedure Length (cm) 2.6 cm 21   Post-Procedure Width (cm) 1 cm 21   Post-Procedure Depth (cm) 0.6 cm 21   Post-Procedure Surface Area (cm^2) 2.6 cm^2 21   Post-Procedure Volume (cm^3) 1.56 cm^3 21   Wound Bed Granulation (%) 5 % 21   Wound Bed Epithelium (%) 0 % 21   Wound Bed Slough (%) 95 % 21   Wound Bed Eschar (%) 0 % 21   Tunneling (cm) 0 cm 21   Undermining (cm) 0 cm 21   Wound Odor None 21   Pulses DP;2+ 21   Right Foot Monofilament 10-point exam (Sensate) 5/10 02/18/21 0815   Exposed Structures JAY 21            PROCEDURE:   -2% viscous lidocaine applied topically to wound bed for approximately 5 minutes prior to debridement  -Curette used to debride wound bed.  Excisional debridement was performed to remove devitalized tissue until healthy, bleeding tissue was visualized.   Entire surface of wound, 4.81 cm2 debrided.  Tissue debrided into the subcutaneous  layer.    -Bleeding controlled with manual pressure.    -Wound care completed by wound RN, refer to flowsheet  -Patient tolerated the procedure well, without c/o pain or discomfort.       Pertinent Labs and Diagnostics:    Labs:     A1c:   Lab Results   Component Value Date/Time    HBA1C 12.7 (H) 2021 11:38 AM          IMAGIN2021 DX foot 2  FINDINGS:  1 fluoroscopic spot film of the foot taken intraoperatively demonstrates second through fifth metatarsal head osteotomies with external fixation using K wires. Patient exposed to 8 seconds of fluoroscopy.    VASCULAR STUDIES: N/A    LAST  WOUND CULTURE:  DATE : 2/10/2021 + right foot   8 d ago   Significant Indicator POSPositive (POS)    Source WND    Site Right Foot    Culture Result -Abnormal     Gram Stain Result Rare WBCs.   Few Yeast.    Culture Result Abnormal   Candida albicans   Moderate  growth                 ASSESSMENT AND PLAN:   1. Amputation of left lower extremity, initial encounter ([facility])  Comments: Patient with a significant amount of slough, small scattered hematomas  -Excisional debridement of wound in clinic today, medically necessary to promote wound healing.  -Patient to return to clinic weekly for assessment and debridement  -Patient to change dressing 1-2 times per week in between clinic visits  -Discussed application of wound VAC with Neisha Faulkner from [facility].  Our par Lisa to put in for authorization.   Wound care: Collagen dressing, Hydrofiber silver strip, nonadhesive foam, dry roll gauze, Coban use unstretched    2. Controlled type 2 diabetes mellitus with diabetic nephropathy, unspecified whether long term insulin use ([facility])  Instructed patient to keep tight control or fasting blood sugar, <140 for optimal wound healing; Implications of loss of protective sensation (LOPS) discussed with patient, including increased risk for amputation.  Advised to check feet at least daily, moisturize feet, and to always wear protective foot wear, arrange meticulous regular foot care by podiatrist or [facility]. Pt with good understanding.     -Patient's last hemoglobin A1c was 12.7 as stated above the importance of tight blood control to reduce the possibility of infection as well as to promote wound healing discussed with the patient in clinic today.        PATIENT EDUCATION  - Importance of adequate nutrition for wound healing  -Advised to go to ER for any increased redness, swelling, drainage, or odor, or if patient develops fever, chills, nausea or vomiting.     >20 minutes spent reviewing the patient's past medical records including operative reports.  Discussing with the patient the use of snap VAC for increased granulation tissue formation and to prevent possible infections.  Educating the patient on diabetes management and control to prevent infection and to promote wound healing.      Please  note that this note may have been created using voice recognition software. I have worked with technical experts from [facility] to optimize the interface.  I have made every reasonable attempt to correct obvious errors, but there may be errors of grammar and possibly content that I did not discover before finalizing the note.    N

## 2021-02-26 ENCOUNTER — NON-PROVIDER VISIT (OUTPATIENT)
Dept: WOUND CARE | Facility: MEDICAL CENTER | Age: 61
End: 2021-02-26
Attending: NURSE PRACTITIONER
Payer: COMMERCIAL

## 2021-02-26 PROCEDURE — 97597 DBRDMT OPN WND 1ST 20 CM/<: CPT

## 2021-02-26 NOTE — PROCEDURES
2% viscous Lidocaine gel dwell time ~5 minutes prior to debridement.   CSWD with curette to remove ~5 cm2 of non viable tissue from wound bed.

## 2021-02-26 NOTE — PATIENT INSTRUCTIONS
-Keep dressings clean and dry. Change dressings if they become over saturated, soiled or fall off.     -Avoid prolonged standing or sitting without elevating your legs.    -Should you experience any significant changes in your wound(s), such as signs of infection (redness, swelling, localized heat, increased pain, fever > 101 F, chills) or have any questions regarding your home care instructions, please contact the wound center at [phone]. If after hours, contact your primary care physician or go to the hospital emergency room.

## 2021-03-04 ENCOUNTER — OFFICE VISIT (OUTPATIENT)
Dept: WOUND CARE | Facility: MEDICAL CENTER | Age: 61
End: 2021-03-04
Attending: NURSE PRACTITIONER
Payer: COMMERCIAL

## 2021-03-04 VITALS
HEART RATE: 70 BPM | SYSTOLIC BLOOD PRESSURE: 110 MMHG | DIASTOLIC BLOOD PRESSURE: 72 MMHG | RESPIRATION RATE: 16 BRPM | TEMPERATURE: 97.8 F | OXYGEN SATURATION: 99 %

## 2021-03-04 DIAGNOSIS — E11.65 UNCONTROLLED TYPE 2 DIABETES MELLITUS WITH HYPERGLYCEMIA (HCC): ICD-10-CM

## 2021-03-04 DIAGNOSIS — E11.42 DIABETIC POLYNEUROPATHY ASSOCIATED WITH TYPE 2 DIABETES MELLITUS (HCC): ICD-10-CM

## 2021-03-04 DIAGNOSIS — S91.301D OPEN WOUND OF RIGHT FOOT, SUBSEQUENT ENCOUNTER: ICD-10-CM

## 2021-03-04 DIAGNOSIS — L84 PRE-ULCERATIVE CALLUSES: ICD-10-CM

## 2021-03-04 PROCEDURE — 99213 OFFICE O/P EST LOW 20 MIN: CPT

## 2021-03-04 PROCEDURE — 11042 DBRDMT SUBQ TIS 1ST 20SQCM/<: CPT | Performed by: NURSE PRACTITIONER

## 2021-03-04 PROCEDURE — 11042 DBRDMT SUBQ TIS 1ST 20SQCM/<: CPT

## 2021-03-04 PROCEDURE — 99213 OFFICE O/P EST LOW 20 MIN: CPT | Mod: 25 | Performed by: NURSE PRACTITIONER

## 2021-03-04 ASSESSMENT — ENCOUNTER SYMPTOMS
WHEEZING: 0
WEAKNESS: 0
CHILLS: 0
SHORTNESS OF BREATH: 0
DIARRHEA: 0
DOUBLE VISION: 0
CONSTIPATION: 0
HEADACHES: 0
NAUSEA: 0
DIZZINESS: 0
VOMITING: 0
BLURRED VISION: 0
FEVER: 0
PALPITATIONS: 0
COUGH: 0

## 2021-03-04 ASSESSMENT — PAIN SCALES - GENERAL: PAINLEVEL: NO PAIN

## 2021-03-04 NOTE — PROGRESS NOTES
Provider Encounter- Full Thickness wound    HISTORY OF PRESENT ILLNESS  Wound History:    START OF CARE IN CLINIC: 2/18/2021    REFERRING PROVIDER: ROME Shook     WOUND- Full Thickness Wound   LOCATION: Right dorsal third MTH, right dorsal fifth MTH   HISTORY: Patient was originally taken to surgery by Dr. Jerald Smalls on 1/6/2021 patient underwent a right gastrocsoleus resection, right metatarsal head excision 2 through 5, right hammertoe second and third, right irrigation debridement of foot multiple compartments, right bone biopsy second metatarsal.  At that time the culture grew out  Peptoniphilus asaccharolyticus.   Patient developed right foot infection with gangrene of the second and third toes. Patient was subsequently taken back to surgery by Dr. Smalls on 2/1/2021.  Dr. Smalls at that time performed a right amputation toes 2 and 3 of metatarsal phalangeal joint, irrigation debridement right foot multiple compartments deep, placement of skin graft substitute over dorsal aspect of right foot.  Of note patient is on doxycycline with an end date of 3/15/2021.  Patient presents to [facility] for care of third and fifth MTH dorsal wounds.    Pertinent Medical History: Diabetes, hypertension, COVID-19   DIABETES HX: Diagnosed with type 2 diabetes in 2019 when he developed an infected foot wound.  He is currently managing with Metformin.  Checks blood sugars 2 times per day and reports that these typically run around 130-200.  States he had a little bit of diabetes education when first diagnosed.  Does  have some numbness in feet.  Usually wears regular, nonprescriptive shoes. Does not check his feet routinely.  He has had previous foot ulcers, antimutations.  Currently works full-time in construction.    TOBACCO USE: Former smoker quit in 2018 0.3 packs/day for 30 years.    Patient's problem list, allergies, and current medications reviewed and updated in Epic    Interval History:  2/18/2021: Clinic visit  with ROME Oliver. Patient states that they are feeling well today.  Patient denies fever, chills, nausea, vomiting, lightheadedness, dizziness, shortness of breath and chest pain.  Patient with significant/nonviable tissue and small hematoma to second MTH.  Patient has some granulation tissue to fifth MTH.  Talked with Neisha Faulkner from [facility] regarding a snap VAC we will put in for approval.  [facility] has put in for authorization.    3/4/2021 : Clinic visit with ROME Lockett, FNP-BC, CWKENDRAN, CFCN.  Sabino presents to the clinic today, accompanied by his wife.  He is feeling well, denies fevers, chills, nausea, vomiting, cough or shortness of breath.  Reports his blood sugar today was 147.  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  Has not yet been on process for shoes and inserts.  Not yet established with a PCP, states in process with [facility].  He has been approved for SNAP, however because his wounds are progressing well currently, not applied today.      REVIEW OF SYSTEMS:   Review of Systems   Constitutional: Negative for chills and fever.   HENT: Negative for hearing loss.    Eyes: Negative for blurred vision and double vision.   Respiratory: Negative for cough, shortness of breath and wheezing.    Cardiovascular: Positive for leg swelling. Negative for chest pain and palpitations.   Gastrointestinal: Negative for constipation, diarrhea, nausea and vomiting.   Musculoskeletal: Negative for joint pain.   Skin: Negative for itching and rash.        Right foot open wound   Neurological: Negative for dizziness, weakness and headaches.       PHYSICAL EXAMINATION:   /72   Pulse 70   Temp 36.6 °C (97.8 °F)   Resp 16   SpO2 99%     Physical Exam   Constitutional: He is oriented to person, place, and time and well-developed, well-nourished, and in no distress.   HENT:   Head: Normocephalic and atraumatic.   Eyes: Pupils are equal,  round, and reactive to light. Conjunctivae are normal.   Cardiovascular:   Monophasic to biphasic pulses are 2 DP and PT by Doppler   Pulmonary/Chest: Effort normal. No respiratory distress. He has no wheezes.   Musculoskeletal:         General: Deformity present.      Cervical back: Normal range of motion.      Comments: Healed amputations of right second and third toes   Neurological: He is alert and oriented to person, place, and time.   3/4/2021: Monofilament testing in clinic, patient since 5/10 bilaterally   Skin: Skin is warm.   Full-thickness wound to dorsal third and fifth MTH, right foot  Refer to wound flowsheet and photos    Dry skin and calluses to plantar feet  Thick callus to distal left hallux   Psychiatric: Mood, memory, affect and judgment normal.       WOUND ASSESSMENT        Wound 02/18/21 Diabetic Ulcer Foot Dorsal Right --Right Dorsal 3rd MTH (Active)   Wound Image    03/04/21 0831   Site Assessment Red;Yellow 03/04/21 0831   Periwound Assessment Intact 03/04/21 0831   Margins Unattached edges 03/04/21 0831   Drainage Amount Moderate 03/04/21 0831   Drainage Description Serosanguineous 03/04/21 0831   Treatments Cleansed;Topical Lidocaine;Provider debridement 03/04/21 0831   Wound Cleansing Normal Saline Irrigation 03/04/21 0831   Periwound Protectant Skin Protectant Wipes to Periwound;Barrier Paste;Skin Moisturizer 03/04/21 0831   Dressing Cleansing/Solutions Normal Saline 03/04/21 0831   Dressing Options Collagen Dressing;Hydrofiber Silver Strip;Nonadhesive Foam;Hypafix Tape 03/04/21 0831   Dressing Changed New 02/18/21 0815   Dressing Change/Treatment Frequency Every 72 hrs, and As Needed 02/18/21 0815   Non-staged Wound Description Full thickness 03/04/21 0831   Wound Length (cm) 0.4 cm 03/04/21 0831   Wound Width (cm) 0.5 cm 03/04/21 0831   Wound Depth (cm) 0.9 cm 03/04/21 0831   Wound Surface Area (cm^2) 0.2 cm^2 03/04/21 0831   Wound Volume (cm^3) 0.18 cm^3 03/04/21 0831   Post-Procedure  Length (cm) 0.5 cm 03/04/21 0831   Post-Procedure Width (cm) 0.6 cm 03/04/21 0831   Post-Procedure Depth (cm) 1 cm 03/04/21 0831   Post-Procedure Surface Area (cm^2) 0.3 cm^2 03/04/21 0831   Post-Procedure Volume (cm^3) 0.3 cm^3 03/04/21 0831   Wound Healing % 68 03/04/21 0831   Wound Bed Granulation (%) 5 % 02/18/21 0815   Wound Bed Epithelium (%) 0 % 02/18/21 0815   Wound Bed Slough (%) 10 % 03/04/21 0831   Wound Bed Eschar (%) 0 % 02/18/21 0815   Tunneling (cm) 0 cm 03/04/21 0831   Undermining (cm) 0 cm 03/04/21 0831   Wound Odor None 03/04/21 0831   Pulses Right;DP;2+ 02/26/21 0952   Right Foot Monofilament 10-point exam (Sensate) 5/10 03/04/21 0831   Left Foot Monofilament 10-point exam (Sensate) 5/10 03/04/21 0831   Exposed Structures None 03/04/21 0831       Wound 02/18/21 Diabetic Ulcer Foot Dorsal Right --Right Dorsal 5th MTH (Active)   Wound Image    03/04/21 0831   Site Assessment Holl[place] 03/04/21 0831   Periwound Assessment Intact;Scar tissue 03/04/21 0831   Margins Attached edges 03/04/21 0831   Drainage Amount Moderate 03/04/21 0831   Drainage Description Serosanguineous 03/04/21 0831   Treatments Cleansed;Topical Lidocaine;Provider debridement 03/04/21 0831   Wound Cleansing Normal Saline Irrigation 03/04/21 0831   Periwound Protectant Skin Protectant Wipes to Periwound;Barrier Paste;Skin Moisturizer 03/04/21 0831   Dressing Cleansing/Solutions Not Applicable 03/04/21 0831   Dressing Options Hydrofiber Silver;Nonadhesive Foam;Hypafix Tape 03/04/21 0831   Dressing Changed New 02/18/21 0815   Dressing Change/Treatment Frequency Every 72 hrs, and As Needed 02/18/21 0815   Non-staged Wound Description Full thickness 03/04/21 0831   Wound Length (cm) 0.7 cm 03/04/21 0831   Wound Width (cm) 0.4 cm 03/04/21 0831   Wound Depth (cm) 0.2 cm 03/04/21 0831   Wound Surface Area (cm^2) 0.28 cm^2 03/04/21 0831   Wound Volume (cm^3) 0.06 cm^3 03/04/21 0831   Post-Procedure Length (cm) 0.7 cm 03/04/21 0831      Post-Procedure Width (cm) 0.5 cm 21   Post-Procedure Depth (cm) 0.2 cm 21   Post-Procedure Surface Area (cm^2) 0.35 cm^2 21   Post-Procedure Volume (cm^3) 0.07 cm^3 21   Wound Healing % 92 21   Wound Bed Granulation (%) 5 % 21   Wound Bed Epithelium (%) 0 % 21   Wound Bed Slough (%) 10 % 21   Wound Bed Eschar (%) 0 % 21   Wound Bed Slough % - (Post-Procedure) 0 % 21   Tunneling (cm) 0 cm 21   Undermining (cm) 0 cm 21   Wound Odor None 21   Pulses Right;DP;2+ 21   Right Foot Monofilament 10-point exam (Sensate) 5/10 03/04/21 0831   Left Foot Monofilament 10-point exam (Sensate) 5/10 03/04/21 0831   Exposed Structures None 21      Monofilament testing with a 10 gram force: sensation intact: decreased bilaterally  Visual Inspection: Feet with maceration, ulcers, fissures.  Pedal pulses: intact bilaterally    PROCEDURE:   -2% viscous lidocaine applied topically to wound beds for approximately 5 minutes prior to debridement  -Curette used to debride wound beds.  Excisional debridement was performed to remove devitalized tissue until healthy, bleeding tissue was visualized.   Entire surface of wounds, 0.65 cm2 debrided.  Tissue debrided into the subcutaneous  layer.    -Bleeding controlled with manual pressure.    -Wound care completed by wound RN, refer to flowsheet  -Patient tolerated the procedure well, without c/o pain or discomfort.       Pertinent Labs and Diagnostics:    Labs:     A1c:   Lab Results   Component Value Date/Time    HBA1C 12.7 (H) 2021 11:38 AM          IMAGIN2021 DX foot 2  FINDINGS:  1 fluoroscopic spot film of the foot taken intraoperatively demonstrates second through fifth metatarsal head osteotomies with external fixation using K wires. Patient exposed to 8 seconds of fluoroscopy.    VASCULAR STUDIES: N/A    LAST   WOUND CULTURE:  DATE : 2/10/2021 + right foot   8 d ago   Significant Indicator POSPositive (POS)    Source WND    Site Right Foot    Culture Result -Abnormal     Gram Stain Result Rare WBCs.   Few Yeast.    Culture Result Abnormal   Candida albicans   Moderate growth                 ASSESSMENT AND PLAN:   1.  Open wound of right foot, subsequent encounter ([facility])      3/4/2021: Area of wounds has decreased significantly since last assessment.  Snap has been approved, however given progress of wounds will hold off for now  -Excisional debridement of wound in clinic today, medically necessary to promote wound healing.  -Patient to return to clinic weekly for assessment and debridement  -Patient to change dressing 1-2 times per week in between clinic visits       Wound care: Collagen dressing, Hydrofiber silver strip, nonadhesive foam, dry roll gauze, Coban use unstretched    2.  Uncontrolled diabetes mellitus type 2 with hyperglycemia ([facility])  Comments:  Patient's last hemoglobin A1c was 12.7.  First diagnosed in 2019 when he developed foot infection.  Did not receive much education at that time    3/4/2021: Patient reports his blood sugar today was 147.  These have been running as high as 200.  He currently does not have PCP, in process of establishing with a new doctor.  -Adverse effects of hyperglycemia wound healing, and on overall health discussed with patient and his wife in clinic today  -Referral to diabetes education  -Referral to endocrinology    3.  Diabetic polyneuropathy associated with type 2 diabetes mellitus  Comments: Monofilament testing in clinic, patient since 5/10 bilaterally    3/4/2021:  - Implications of loss of protective sensation (LOPS) discussed with patient- including increased risk for amputation.  Advised to check feet at least daily, moisturize feet, and to always wear protective foot wear.   -Rx for orthotic shoes and inserts provided in clinic today.  Patient informed that he would need  to replace these yearly    4.  Preulcerative calluses    3/4/2021: Patient has thick callus to the distal aspect of left hallux.  Skin to both feet dry flaking, calluses over multiple bony prominences.  At risk for ulceration.  Should not trim toenails or manage calluses on his own due to diabetic neuropathy.  -Referral to podiatry      PATIENT EDUCATION  - Importance of adequate nutrition for wound healing  -Advised to go to ER for any increased redness, swelling, drainage, or odor, or if patient develops fever, chills, nausea or vomiting.     20 minutes spent reviewing the patient's past medical records including operative reports.  Discussing with the patient the use of snap VAC for increased granulation tissue formation and to prevent possible infections.  Educating the patient on diabetes management and control to prevent infection and to promote wound healing.      Please note that this note may have been created using voice recognition software. I have worked with technical experts from [employer] to optimize the interface.  I have made every reasonable attempt to correct obvious errors, but there may be errors of grammar and possibly content that I did not discover before finalizing the note.    N

## 2021-03-04 NOTE — PROGRESS NOTES
Patient given prescription for diabetic shoes and inserts from [employer] for diabetic shoes and inserts.  APRN to refer to diabetic education, podiatry, and endocrinology.

## 2021-03-04 NOTE — PATIENT INSTRUCTIONS
-Keep dressings clean and dry. Change dressings once between wound clinic visits, and if the dressings become saturated, soiled, or fall off.    -Avoid prolonged standing or sitting without elevating your legs.    -Never walk around the house barefoot. Always wear a rubber soled slipper when walking around the house.    -Should you experience any significant changes in your wound(s), such as signs of infection (redness, swelling, localized heat, increased pain, fever > 101 F, chills) or have any questions regarding your home care instructions, please contact the wound center at [phone]. If after hours, contact your primary care physician or go to the hospital emergency room.

## 2021-03-12 ENCOUNTER — NON-PROVIDER VISIT (OUTPATIENT)
Dept: WOUND CARE | Facility: MEDICAL CENTER | Age: 61
End: 2021-03-12
Attending: NURSE PRACTITIONER
Payer: COMMERCIAL

## 2021-03-12 PROCEDURE — 97597 DBRDMT OPN WND 1ST 20 CM/<: CPT

## 2021-03-12 NOTE — PROCEDURES
2% viscous lidocaine gel dwell time ~3 minutes prior to debridement  CSWD with curette to remove ~2 cm2 of non viable tissue from wound bed and louie wound.

## 2021-03-12 NOTE — PATIENT INSTRUCTIONS
-Keep dressings clean and dry. Change dressings if they become over saturated, soiled or fall off.     -Avoid prolonged standing or sitting without elevating your legs.    -Should you experience any significant changes in your wound(s), such as signs of infection (redness, swelling, localized heat, increased pain, fever > 101 F, chills) or have any questions regarding your home care instructions, please contact the wound center at [phone]. If after hours, contact your primary care physician or go to the hospital emergency room.

## 2021-03-15 DIAGNOSIS — Z23 NEED FOR VACCINATION: ICD-10-CM

## 2021-03-19 ENCOUNTER — OFFICE VISIT (OUTPATIENT)
Dept: WOUND CARE | Facility: MEDICAL CENTER | Age: 61
End: 2021-03-19
Attending: NURSE PRACTITIONER
Payer: COMMERCIAL

## 2021-03-19 VITALS
SYSTOLIC BLOOD PRESSURE: 129 MMHG | DIASTOLIC BLOOD PRESSURE: 79 MMHG | TEMPERATURE: 97.6 F | RESPIRATION RATE: 18 BRPM | HEART RATE: 90 BPM | OXYGEN SATURATION: 95 %

## 2021-03-19 DIAGNOSIS — E11.65 UNCONTROLLED TYPE 2 DIABETES MELLITUS WITH HYPERGLYCEMIA (HCC): ICD-10-CM

## 2021-03-19 DIAGNOSIS — E11.42 DIABETIC POLYNEUROPATHY ASSOCIATED WITH TYPE 2 DIABETES MELLITUS (HCC): ICD-10-CM

## 2021-03-19 DIAGNOSIS — S91.301D OPEN WOUND OF RIGHT FOOT, SUBSEQUENT ENCOUNTER: ICD-10-CM

## 2021-03-19 DIAGNOSIS — L84 PRE-ULCERATIVE CALLUSES: ICD-10-CM

## 2021-03-19 PROCEDURE — 99212 OFFICE O/P EST SF 10 MIN: CPT | Mod: 25 | Performed by: NURSE PRACTITIONER

## 2021-03-19 PROCEDURE — 11042 DBRDMT SUBQ TIS 1ST 20SQCM/<: CPT

## 2021-03-19 PROCEDURE — 99212 OFFICE O/P EST SF 10 MIN: CPT

## 2021-03-19 PROCEDURE — 11042 DBRDMT SUBQ TIS 1ST 20SQCM/<: CPT | Performed by: NURSE PRACTITIONER

## 2021-03-19 ASSESSMENT — ENCOUNTER SYMPTOMS
PALPITATIONS: 0
VOMITING: 0
WHEEZING: 0
WEAKNESS: 0
BLURRED VISION: 0
DIZZINESS: 0
DOUBLE VISION: 0
COUGH: 0
DIARRHEA: 0
HEADACHES: 0
CHILLS: 0
CONSTIPATION: 0
SHORTNESS OF BREATH: 0
NAUSEA: 0
FEVER: 0

## 2021-03-19 NOTE — PROGRESS NOTES
Provider Encounter- Full Thickness wound    HISTORY OF PRESENT ILLNESS  Wound History:    START OF CARE IN CLINIC: 2/18/2021    REFERRING PROVIDER: ROME Shook     WOUND- Full Thickness Wound   LOCATION: Right dorsal third MTH, right dorsal fifth MTH   HISTORY: Patient was originally taken to surgery by Dr. Jerald Smalls on 1/6/2021 patient underwent a right gastrocsoleus resection, right metatarsal head excision 2 through 5, right hammertoe second and third, right irrigation debridement of foot multiple compartments, right bone biopsy second metatarsal.  At that time the culture grew out  Peptoniphilus asaccharolyticus.   Patient developed right foot infection with gangrene of the second and third toes. Patient was subsequently taken back to surgery by Dr. Smalls on 2/1/2021.  Dr. Smalls at that time performed a right amputation toes 2 and 3 of metatarsal phalangeal joint, irrigation debridement right foot multiple compartments deep, placement of skin graft substitute over dorsal aspect of right foot.  Of note patient is on doxycycline with an end date of 3/15/2021.  Patient presents to [facility] for care of third and fifth MTH dorsal wounds.    Pertinent Medical History: Diabetes, hypertension, COVID-19   DIABETES HX: Diagnosed with type 2 diabetes in 2019 when he developed an infected foot wound.  He is currently managing with Metformin.  Checks blood sugars 2 times per day and reports that these typically run around 130-200.  States he had a little bit of diabetes education when first diagnosed.  Does  have some numbness in feet.  Usually wears regular, nonprescriptive shoes. Does not check his feet routinely.  He has had previous foot ulcers, antimutations.  Currently works full-time in construction.    TOBACCO USE: Former smoker quit in 2018 0.3 packs/day for 30 years.    Patient's problem list, allergies, and current medications reviewed and updated in Epic    Interval History:  2/18/2021: Clinic visit  with ROME Oliver. Patient states that they are feeling well today.  Patient denies fever, chills, nausea, vomiting, lightheadedness, dizziness, shortness of breath and chest pain.  Patient with significant/nonviable tissue and small hematoma to second MTH.  Patient has some granulation tissue to fifth MTH.  Talked with Neisha Faulkner from [facility] regarding a snap VAC we will put in for approval.  [facility] has put in for authorization.    3/4/2021 : Clinic visit with ROME Lockett, TORI, BROWN, ANTON.  Sabino presents to the clinic today, accompanied by his wife.  He is feeling well, denies fevers, chills, nausea, vomiting, cough or shortness of breath.  Reports his blood sugar today was 147.  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  Has not yet been on process for shoes and inserts.  Not yet established with a PCP, states in process with [place].  He has been approved for SNAP, however because his wounds are progressing well currently, not applied today.    3/19/2021 : Clinic visit with ROME Lockett, TORI, BROWN, ANTON.  Been he states he is feeling well today, denies fevers, chills, nausea, vomiting, cough or shortness of breath.  He reports his blood sugar today was 137.  He has not yet been contacted by endocrinology.  Referral has apparently been authorized, patient was provided with phone number for [facility], and advised to call make an appointment.  He was also seen by podiatry, who has arranged for orthotic footwear.  He did not use Rx with provided last visit.      REVIEW OF SYSTEMS:   Review of Systems   Constitutional: Negative for chills and fever.   HENT: Negative for hearing loss.    Eyes: Negative for blurred vision and double vision.   Respiratory: Negative for cough, shortness of breath and wheezing.    Cardiovascular: Positive for leg swelling. Negative for chest pain and palpitations.   Gastrointestinal:  Negative for constipation, diarrhea, nausea and vomiting.   Musculoskeletal: Negative for joint pain.   Skin: Negative for itching and rash.        Right foot open wound   Neurological: Negative for dizziness, weakness and headaches.       PHYSICAL EXAMINATION:   /79   Pulse 90   Temp 36.4 °C (97.6 °F) (Temporal)   Resp 18   SpO2 95%     Physical Exam   Constitutional: He is oriented to person, place, and time and well-developed, well-nourished, and in no distress.   HENT:   Head: Normocephalic and atraumatic.   Eyes: Pupils are equal, round, and reactive to light. Conjunctivae are normal.   Cardiovascular:   Monophasic to biphasic pulses are 2 DP and PT by Doppler   Pulmonary/Chest: Effort normal. No respiratory distress. He has no wheezes.   Musculoskeletal:         General: Deformity present.      Cervical back: Normal range of motion.      Comments: Healed amputations of right second and third toes   Neurological: He is alert and oriented to person, place, and time.   3/4/2021: Monofilament testing in clinic, patient since 5/10 bilaterally   Skin: Skin is warm.   Full-thickness wound to dorsal third and fifth MTH, right foot  Refer to wound flowsheet and photos    Dry skin and calluses to plantar feet  Thick callus to distal left hallux   Psychiatric: Mood, memory, affect and judgment normal.       WOUND ASSESSMENT    Wound 02/18/21 Diabetic Ulcer Foot Dorsal Right --Right Dorsal 3rd MTH (Active)   Wound Image    03/19/21 0823   Site Assessment Pink;White 03/19/21 0823   Periwound Assessment Maceration 03/19/21 0823   Margins Unattached edges 03/19/21 0823   Drainage Amount Scant 03/19/21 0823   Drainage Description Serosanguineous 03/19/21 0823   Treatments Cleansed;Topical Lidocaine;CSWD - Conservative Sharp Wound Debridement 03/12/21 0857   Wound Cleansing Foam Cleanser/Washcloth 03/19/21 0823   Periwound Protectant Skin Protectant Wipes to Periwound;Barrier Paste;Skin Moisturizer 03/19/21 0823      Dressing Cleansing/Solutions Normal Saline 03/19/21 0823   Dressing Options Collagen Dressing;Nonadhesive Foam;Hypafix Tape 03/19/21 0823   Dressing Changed New 02/18/21 0815   Dressing Change/Treatment Frequency Every 72 hrs, and As Needed 02/18/21 0815   Non-staged Wound Description Full thickness 03/19/21 0823   Wound Length (cm) 0.1 cm 03/19/21 0823   Wound Width (cm) 0.2 cm 03/19/21 0823   Wound Depth (cm) 0.3 cm 03/19/21 0823   Wound Surface Area (cm^2) 0.02 cm^2 03/19/21 0823   Wound Volume (cm^3) 0.01 cm^3 03/19/21 0823   Post-Procedure Length (cm) 0.4 cm 03/19/21 0823   Post-Procedure Width (cm) 0.3 cm 03/19/21 0823   Post-Procedure Depth (cm) 0.3 cm 03/19/21 0823   Post-Procedure Surface Area (cm^2) 0.12 cm^2 03/19/21 0823   Post-Procedure Volume (cm^3) 0.04 cm^3 03/19/21 0823   Wound Healing % 98 03/19/21 0823   Wound Bed Granulation (%) 5 % 02/18/21 0815   Wound Bed Epithelium (%) 0 % 02/18/21 0815   Wound Bed Slough (%) 10 % 03/19/21 0823   Wound Bed Eschar (%) 0 % 02/18/21 0815   Wound Bed Slough % - (Post-Procedure) 0 % 03/12/21 0857   Tunneling (cm) 0 cm 03/19/21 0823   Undermining (cm) 0 cm 03/19/21 0823   Wound Odor None 03/19/21 0823   Pulses Right;DP;2+ 03/12/21 0857   Right Foot Monofilament 10-point exam (Sensate) 5/10 03/04/21 0831   Left Foot Monofilament 10-point exam (Sensate) 5/10 03/04/21 0831   Exposed Structures None 03/19/21 0823       Wound 02/18/21 Diabetic Ulcer Foot Dorsal Right --Right Dorsal 5th MTH (Active)   Wound Image   03/19/21 0823   Site Assessment Dry;Brown 03/19/21 0823   Periwound Assessment Fragile;Scar tissue 03/19/21 0823   Margins Attached edges 03/12/21 0857   Drainage Amount None 03/19/21 0823   Drainage Description Serosanguineous 03/12/21 0857   Treatments Cleansed;Site care 03/19/21 0823   Wound Cleansing Foam Cleanser/Washcloth 03/19/21 0823   Periwound Protectant Skin Protectant Wipes to Periwound 03/19/21 0823   Dressing Cleansing/Solutions Not Applicable  03/19/21 0823   Dressing Options Nonadhesive Foam;Hypafix Tape 03/19/21 0823   Dressing Changed New 02/18/21 0815   Dressing Change/Treatment Frequency Every 72 hrs, and As Needed 02/18/21 0815   Non-staged Wound Description Full thickness 03/12/21 0857   Wound Length (cm) 0.7 cm 03/04/21 0831   Wound Width (cm) 0.4 cm 03/04/21 0831   Wound Depth (cm) 0.2 cm 03/04/21 0831   Wound Surface Area (cm^2) 0.28 cm^2 03/04/21 0831   Wound Volume (cm^3) 0.06 cm^3 03/04/21 0831   Post-Procedure Length (cm) 0.3 cm 03/12/21 0857   Post-Procedure Width (cm) 0.2 cm 03/12/21 0857   Post-Procedure Depth (cm) 0.1 cm 03/12/21 0857   Post-Procedure Surface Area (cm^2) 0.06 cm^2 03/12/21 0857   Post-Procedure Volume (cm^3) 0.01 cm^3 03/12/21 0857   Wound Healing % 92 03/04/21 0831   Wound Bed Granulation (%) 5 % 02/18/21 0815   Wound Bed Epithelium (%) 0 % 02/18/21 0815   Wound Bed Slough (%) 15 % 03/12/21 0857   Wound Bed Eschar (%) 0 % 02/18/21 0815   Wound Bed Slough % - (Post-Procedure) 0 % 02/26/21 0952   Tunneling (cm) 0 cm 03/19/21 0823   Undermining (cm) 0 cm 03/19/21 0823   Wound Odor None 03/19/21 0823   Pulses Right;DP;2+ 03/12/21 0857   Right Foot Monofilament 10-point exam (Sensate) 5/10 03/04/21 0831   Left Foot Monofilament 10-point exam (Sensate) 5/10 03/04/21 0831   Exposed Structures None 03/19/21 0823              Monofilament testing with a 10 gram force: sensation intact: decreased bilaterally  Visual Inspection: Feet with maceration, ulcers, fissures.  Pedal pulses: intact bilaterally    PROCEDURE:   -2% viscous lidocaine applied topically to wound bed for approximately 5 minutes prior to debridement  -Curette used to debride wound bed.  Excisional debridement was performed to remove devitalized tissue until healthy, bleeding tissue was visualized.   Entire surface of wound, 0.12 cm2 debrided.  Tissue debrided into the subcutaneous  layer.    -Bleeding controlled with manual pressure.    -Wound care completed by  wound RN, refer to flowsheet  -Patient tolerated the procedure well, without c/o pain or discomfort.       Pertinent Labs and Diagnostics:    Labs:     A1c:   Lab Results   Component Value Date/Time    HBA1C 12.7 (H) 2021 11:38 AM          IMAGIN2021 DX foot 2  FINDINGS:  1 fluoroscopic spot film of the foot taken intraoperatively demonstrates second through fifth metatarsal head osteotomies with external fixation using K wires. Patient exposed to 8 seconds of fluoroscopy.    VASCULAR STUDIES: N/A    LAST  WOUND CULTURE:  DATE : 2/10/2021 + right foot   8 d ago   Significant Indicator POSPositive (POS)    Source WND    Site Right Foot    Culture Result -Abnormal     Gram Stain Result Rare WBCs.   Few Yeast.    Culture Result Abnormal   Candida albicans   Moderate growth                 ASSESSMENT AND PLAN:   1.  Open wound of right foot, subsequent encounter ([facility])      3/12/2021: Area of wounds has decreased significantly since last assessment, lateral wound essentially resolved and did not require debridement today.  -Excisional debridement of medial wound in clinic today, medically necessary to promote wound healing.  -Patient to return to clinic weekly for assessment and debridement  -Patient to change dressing 1-2 times per week in between clinic visits       Wound care: Collagen dressing, Hydrofiber silver strip, nonadhesive foam, dry roll gauze, Coban use unstretched    2.  Uncontrolled diabetes mellitus type 2 with hyperglycemia ([facility])  Comments:  Patient's last hemoglobin A1c was 12.7.  First diagnosed in 2019 when he developed foot infection.  Did not receive much education at that time    3/4/2021: Patient reports his blood sugar today was 137.  He was referred to endocrinology previously, states he was not contacted.  Referral reviewed in [facility], has been authorized.  Phone number for [facility] provided, patient advised to call and make appointment ASAP.  -Adverse effects of  hyperglycemia wound healing, and on overall health discussed with patient and his wife in clinic today  -Patient to make appointment with endocrinologist    3.  Diabetic polyneuropathy associated with type 2 diabetes mellitus  Comments: Monofilament testing in clinic, patient since 5/10 bilaterally    3/19/2021:  - Implications of loss of protective sensation (LOPS) previously discussed with patient- including increased risk for amputation.  Advised to check feet at least daily, moisturize feet, and to always wear protective foot wear.   -Rx for orthotic shoes and inserts provided previously.  However he was seen by podiatry who has referred him to a different orthotist.      4.  Preulcerative calluses    3/12/2021: Patient has thick callus to the distal aspect of left hallux.  Skin to both feet dry flaking, calluses over multiple bony prominences.  At risk for ulceration.  Should not trim toenails or manage calluses on his own due to diabetic neuropathy.  -Patient is now established with podiatry      PATIENT EDUCATION  - Importance of adequate nutrition for wound healing  -Advised to go to ER for any increased redness, swelling, drainage, or odor, or if patient develops fever, chills, nausea or vomiting.     15 minutes spent reviewing the patient's past medical records including operative reports.  Discussing with the patient the use of snap VAC for increased granulation tissue formation and to prevent possible infections.  Educating the patient on diabetes management and control to prevent infection and to promote wound healing.      Please note that this note may have been created using voice recognition software. I have worked with technical experts from [employer] to optimize the interface.  I have made every reasonable attempt to correct obvious errors, but there may be errors of grammar and possibly content that I did not discover before finalizing the note.    N

## 2021-03-19 NOTE — PATIENT INSTRUCTIONS
-Keep dressings clean and dry. Change dressings if they become over saturated, soiled or fall off.     -Avoid prolonged standing or sitting without elevating your legs.    -Should you experience any significant changes in your wound(s), such as signs of infection (redness, swelling, localized heat, increased pain, fever > 101 F, chills) or have any questions regarding your home care instructions, please contact the wound center at [phone]. If after hours, contact your primary care physician or go to the hospital emergency room.

## 2021-03-26 ENCOUNTER — NON-PROVIDER VISIT (OUTPATIENT)
Dept: WOUND CARE | Facility: MEDICAL CENTER | Age: 61
End: 2021-03-26
Attending: NURSE PRACTITIONER
Payer: COMMERCIAL

## 2021-04-02 ENCOUNTER — OFFICE VISIT (OUTPATIENT)
Dept: WOUND CARE | Facility: MEDICAL CENTER | Age: 61
End: 2021-04-02
Attending: NURSE PRACTITIONER
Payer: COMMERCIAL

## 2021-04-02 VITALS
SYSTOLIC BLOOD PRESSURE: 122 MMHG | HEART RATE: 83 BPM | DIASTOLIC BLOOD PRESSURE: 79 MMHG | RESPIRATION RATE: 16 BRPM | OXYGEN SATURATION: 96 % | TEMPERATURE: 98.2 F

## 2021-04-02 DIAGNOSIS — S91.301D OPEN WOUND OF RIGHT FOOT, SUBSEQUENT ENCOUNTER: ICD-10-CM

## 2021-04-02 DIAGNOSIS — E11.65 UNCONTROLLED TYPE 2 DIABETES MELLITUS WITH HYPERGLYCEMIA (HCC): ICD-10-CM

## 2021-04-02 DIAGNOSIS — E11.42 DIABETIC POLYNEUROPATHY ASSOCIATED WITH TYPE 2 DIABETES MELLITUS (HCC): ICD-10-CM

## 2021-04-02 DIAGNOSIS — L84 PRE-ULCERATIVE CALLUSES: ICD-10-CM

## 2021-04-02 PROCEDURE — 99212 OFFICE O/P EST SF 10 MIN: CPT

## 2021-04-02 PROCEDURE — 99213 OFFICE O/P EST LOW 20 MIN: CPT | Performed by: NURSE PRACTITIONER

## 2021-04-02 ASSESSMENT — ENCOUNTER SYMPTOMS
HEADACHES: 0
CONSTIPATION: 0
DIZZINESS: 0
DOUBLE VISION: 0
VOMITING: 0
COUGH: 0
BLURRED VISION: 0
WHEEZING: 0
FEVER: 0
DIARRHEA: 0
CHILLS: 0
NAUSEA: 0
SHORTNESS OF BREATH: 0
WEAKNESS: 0
PALPITATIONS: 0

## 2021-04-02 NOTE — PROGRESS NOTES
Provider Encounter- Full Thickness wound    HISTORY OF PRESENT ILLNESS  Wound History:    START OF CARE IN CLINIC: 2/18/2021    REFERRING PROVIDER: ROME Shook     WOUND- Full Thickness Wound   LOCATION: Right dorsal third MTH, right dorsal fifth MTH   HISTORY: Patient was originally taken to surgery by Dr. Jerald Smalls on 1/6/2021 patient underwent a right gastrocsoleus resection, right metatarsal head excision 2 through 5, right hammertoe second and third, right irrigation debridement of foot multiple compartments, right bone biopsy second metatarsal.  At that time the culture grew out  Peptoniphilus asaccharolyticus.   Patient developed right foot infection with gangrene of the second and third toes. Patient was subsequently taken back to surgery by Dr. Smalls on 2/1/2021.  Dr. Smalls at that time performed a right amputation toes 2 and 3 of metatarsal phalangeal joint, irrigation debridement right foot multiple compartments deep, placement of skin graft substitute over dorsal aspect of right foot.  Of note patient is on doxycycline with an end date of 3/15/2021.  Patient presents to [facility] for care of third and fifth MTH dorsal wounds.    Pertinent Medical History: Diabetes, hypertension, COVID-19   DIABETES HX: Diagnosed with type 2 diabetes in 2019 when he developed an infected foot wound.  He is currently managing with Metformin.  Checks blood sugars 2 times per day and reports that these typically run around 130-200.  States he had a little bit of diabetes education when first diagnosed.  Does  have some numbness in feet.  Usually wears regular, nonprescriptive shoes. Does not check his feet routinely.  He has had previous foot ulcers, antimutations.  Currently works full-time in construction.    TOBACCO USE: Former smoker quit in 2018 0.3 packs/day for 30 years.    Patient's problem list, allergies, and current medications reviewed and updated in Epic    Interval History:  2/18/2021: Clinic visit  with ROME Oliver. Patient states that they are feeling well today.  Patient denies fever, chills, nausea, vomiting, lightheadedness, dizziness, shortness of breath and chest pain.  Patient with significant/nonviable tissue and small hematoma to second MTH.  Patient has some granulation tissue to fifth MTH.  Talked with Neisha Faulkner from [facility] regarding a snap VAC we will put in for approval.  [facility] has put in for authorization.    3/4/2021 : Clinic visit with ROME Lockett, TORI, BROWN, ANTON.  Sabino presents to the clinic today, accompanied by his wife.  He is feeling well, denies fevers, chills, nausea, vomiting, cough or shortness of breath.  Reports his blood sugar today was 147.  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  Has not yet been on process for shoes and inserts.  Not yet established with a PCP, states in process with [place].  He has been approved for SNAP, however because his wounds are progressing well currently, not applied today.    3/19/2021 : Clinic visit with ROME Lockett, TORI, BROWN, ANTON.  Been he states he is feeling well today, denies fevers, chills, nausea, vomiting, cough or shortness of breath.  He reports his blood sugar today was 137.  He has not yet been contacted by endocrinology.  Referral has apparently been authorized, patient was provided with phone number for [facility], and advised to call make an appointment.  He was also seen by podiatry, who has arranged for orthotic footwear.  He did not use Rx with provided last visit.      REVIEW OF SYSTEMS:   Review of Systems   Constitutional: Negative for chills and fever.   HENT: Negative for hearing loss.    Eyes: Negative for blurred vision and double vision.   Respiratory: Negative for cough, shortness of breath and wheezing.    Cardiovascular: Positive for leg swelling. Negative for chest pain and palpitations.   Gastrointestinal:  Negative for constipation, diarrhea, nausea and vomiting.   Musculoskeletal: Negative for joint pain.   Skin: Negative for itching and rash.        Right foot open wound   Neurological: Negative for dizziness, weakness and headaches.       PHYSICAL EXAMINATION:   /79   Pulse 83   Temp 36.8 °C (98.2 °F) (Temporal)   Resp 16   SpO2 96%     Physical Exam   Constitutional: He is oriented to person, place, and time and well-developed, well-nourished, and in no distress.   HENT:   Head: Normocephalic and atraumatic.   Eyes: Pupils are equal, round, and reactive to light. Conjunctivae are normal.   Cardiovascular:   Monophasic to biphasic pulses are 2 DP and PT by Doppler   Pulmonary/Chest: Effort normal. No respiratory distress. He has no wheezes.   Musculoskeletal:         General: Deformity present.      Cervical back: Normal range of motion.      Comments: Healed amputations of right second and third toes   Neurological: He is alert and oriented to person, place, and time.   3/4/2021: Monofilament testing in clinic, patient since 5/10 bilaterally   Skin: Skin is warm.   Full-thickness wound to dorsal third and fifth MTH, right foot  Refer to wound flowsheet and photos    Dry skin and calluses to plantar feet  Thick callus to distal left hallux   Psychiatric: Mood, memory, affect and judgment normal.       WOUND ASSESSMENT                       Monofilament testing with a 10 gram force: sensation intact: decreased bilaterally  Visual Inspection: Feet with maceration, ulcers, fissures.  Pedal pulses: intact bilaterally    PROCEDURE:   - wound resolved, no need for debridement.    -Wound care completed by wound RN, refer to flowsheet  -Patient tolerated the procedure well, without c/o pain or discomfort.       Pertinent Labs and Diagnostics:    Labs:     A1c:   Lab Results   Component Value Date/Time    HBA1C 12.7 (H) 2021 11:38 AM          IMAGIN2021 DX foot 2  FINDINGS:  1 fluoroscopic spot film  of the foot taken intraoperatively demonstrates second through fifth metatarsal head osteotomies with external fixation using K wires. Patient exposed to 8 seconds of fluoroscopy.    VASCULAR STUDIES: N/A    LAST  WOUND CULTURE:  DATE : 2/10/2021 + right foot   8 d ago   Significant Indicator POSPositive (POS)    Source WND    Site Right Foot    Culture Result -Abnormal     Gram Stain Result Rare WBCs.   Few Yeast.    Culture Result Abnormal   Candida albicans   Moderate growth                 ASSESSMENT AND PLAN:   1.  Open wound of right foot, subsequent encounter ([facility])      4/2/2021: Wound resolved  -Discharge from [facility]  -Patient to return to clinic ASAP if wound recurs, or if he/she develops new wounds       Wound care: Collagen dressing, Hydrofiber silver strip, nonadhesive foam, dry roll gauze, Coban use unstretched    2.  Uncontrolled diabetes mellitus type 2 with hyperglycemia ([facility])  Comments:  Patient's last hemoglobin A1c was 12.7.  First diagnosed in 2019 when he developed foot infection.  Did not receive much education at that time    3/4/2021: Patient reports his blood sugar today was in the 150s.  -Adverse effects of hyperglycemia discussed with patient in clinic today.  -Patient to make appointment with endocrinologist    3.  Diabetic polyneuropathy associated with type 2 diabetes mellitus  Comments: Monofilament testing in clinic, patient since 5/10 bilaterally      - Implications of loss of protective sensation (LOPS) previously discussed with patient- including increased risk for amputation.  Advised to check feet at least daily, moisturize feet, and to always wear protective foot wear.   -Rx for orthotic shoes and inserts provided previously.  He has begun process through [employer] [facility]    4.  Preulcerative calluses    4/2/2021: Patient has thick callus to the distal aspect of left hallux.  Skin to both feet dry flaking, calluses over multiple bony prominences.  At risk for ulceration.  Should  not trim toenails or manage calluses on his own due to diabetic neuropathy.  -Patient is now established with podiatry      PATIENT EDUCATION  - Importance of adequate nutrition for wound healing  -Advised to go to ER for any increased redness, swelling, drainage, or odor, or if patient develops fever, chills, nausea or vomiting.     20 minutes spent reviewing the patient's past medical records including operative reports.  Discussing with the patient the use of snap VAC for increased granulation tissue formation and to prevent possible infections.  Educating the patient on diabetes management and control to prevent infection and to promote wound healing.      Please note that this note may have been created using voice recognition software. I have worked with technical experts from [employer] to optimize the interface.  I have made every reasonable attempt to correct obvious errors, but there may be errors of grammar and possibly content that I did not discover before finalizing the note.    N

## (undated) DEVICE — GLOVE BIOGEL PI INDICATOR SZ 8.0 SURGICAL PF LF -(50/BX 4BX/CA)

## (undated) DEVICE — SCRUB SOLUTION EXIDINE 4% 40Z - 48/CS CHLORAHEXADINE GLUCONATE

## (undated) DEVICE — SLEEVE, VASO, THIGH, MED

## (undated) DEVICE — SET EXTENSION WITH 2 PORTS (48EA/CA) ***PART #2C8610 IS A SUBSTITUTE*****

## (undated) DEVICE — LACTATED RINGERS INJ 1000 ML - (14EA/CA 60CA/PF)

## (undated) DEVICE — GOWN WARMING STANDARD FLEX - (30/CA)

## (undated) DEVICE — CUP DENTURE W/ LID - (200/CA)

## (undated) DEVICE — GLOVE BIOGEL INDICATOR SZ 6.5 SURGICAL PF LTX - (50PR/BX 4BX/CA)

## (undated) DEVICE — PADDING CAST 4 IN STERILE - 4 X 4 YDS (24/CA)

## (undated) DEVICE — SOD. CHL. INJ. 0.9% 1000 ML - (14EA/CA 60CA/PF)

## (undated) DEVICE — PROTECTOR ULNA NERVE - (36PR/CA)

## (undated) DEVICE — DRESSING 3X8 ADAPTIC GAUZE - NON-ADHERING (36/PK 6PK/BX)

## (undated) DEVICE — BANDAGE ELASTIC 6 HONEYCOMB - 6X5YD LF (20/CA)"

## (undated) DEVICE — SET IRRIGATION CYSTOSCOPY TUBE L80 IN (20EA/CA)

## (undated) DEVICE — GLOVE BIOGEL INDICATOR SZ 7SURGICAL PF LTX - (50/BX 4BX/CA)

## (undated) DEVICE — PAD LAP STERILE 18 X 18 - (5/PK 40PK/CA)

## (undated) DEVICE — KIT ANESTHESIA W/CIRCUIT & 3/LT BAG W/FILTER (20EA/CA)

## (undated) DEVICE — HEAD HOLDER JUNIOR/ADULT

## (undated) DEVICE — CANISTER SUCTION 3000ML MECHANICAL FILTER AUTO SHUTOFF MEDI-VAC NONSTERILE LF DISP  (40EA/CA)

## (undated) DEVICE — TRAY SKIN SCRUB PVP WET (20EA/CA) PART #DYND70356 DISCONTINUED

## (undated) DEVICE — ELECTRODE DUAL RETURN W/ CORD - (50/PK)

## (undated) DEVICE — PADDING CAST 6 IN STERILE - 6 X 4 YDS (24/CA)

## (undated) DEVICE — SET LEADWIRE 5 LEAD BEDSIDE DISPOSABLE ECG (1SET OF 5/EA)

## (undated) DEVICE — GOWN SURGEONS X-LARGE - DISP. (30/CA)

## (undated) DEVICE — SUCTION INSTRUMENT YANKAUER BULBOUS TIP W/O VENT (50EA/CA)

## (undated) DEVICE — MASK, LARYNGEAL AIRWAY #4

## (undated) DEVICE — TOURNIQUET CUFF 34 X 4 ONE PORT DISP - STERILE (10/BX)

## (undated) DEVICE — ELECTRODE 850 FOAM ADHESIVE - HYDROGEL RADIOTRNSPRNT (50/PK)

## (undated) DEVICE — BANDAGE ELASTIC 4 HONEYCOMB - 4"X5YD LF (20/CA)"

## (undated) DEVICE — GLOVE SZ 6.5 BIOGEL PI MICRO - PF LF (50PR/BX)

## (undated) DEVICE — GLOVE BIOGEL PI INDICATOR SZ 6.5 SURGICAL PF LF - (50/BX 4BX/CA)

## (undated) DEVICE — WRAP CO-FLEX 4IN X 5YD STERIL - SELF-ADHERENT (18/CA)

## (undated) DEVICE — PACK LOWER EXTREMITY - (2/CA)

## (undated) DEVICE — SENSOR SPO2 NEO LNCS ADHESIVE (20/BX) SEE USER NOTES

## (undated) DEVICE — TUBING CLEARLINK DUO-VENT - C-FLO (48EA/CA)

## (undated) DEVICE — GLOVE BIOGEL ECLIPSE PF LATEX SIZE 8.0  (50PR/BX)

## (undated) DEVICE — NEPTUNE 4 PORT MANIFOLD - (20/PK)

## (undated) DEVICE — MASK ANESTHESIA ADULT  - (100/CA)

## (undated) DEVICE — DRESSING ABDOMINAL PAD STERILE 8 X 10" (360EA/CA)"

## (undated) DEVICE — SUTURE GENERAL

## (undated) DEVICE — PADDING CAST 4 IN X 4 YDS - SOF-ROLL (12RL/BG 6BG/CT)

## (undated) DEVICE — BLADE SURGICAL #15 - (50/BX 3BX/CA)

## (undated) DEVICE — SODIUM CHL IRRIGATION 0.9% 1000ML (12EA/CA)

## (undated) DEVICE — GLOVE BIOGEL ECLIPSE  PF LATEX SIZE 6.5 (50PR/BX)

## (undated) DEVICE — GLOVE SZ 7 BIOGEL PI MICRO - PF LF (50PR/BX 4BX/CA)

## (undated) DEVICE — GLOVE BIOGEL PI INDICATOR SZ 7.0 SURGICAL PF LF - (50/BX 4BX/CA)

## (undated) DEVICE — SUTURE 3-0 VICRYL PLUS SH - 8X 18 INCH (12/BX)

## (undated) DEVICE — SUTURE 3-0 ETHILON PS-1 (36PK/BX)

## (undated) DEVICE — Device